# Patient Record
Sex: MALE | Race: WHITE | NOT HISPANIC OR LATINO
[De-identification: names, ages, dates, MRNs, and addresses within clinical notes are randomized per-mention and may not be internally consistent; named-entity substitution may affect disease eponyms.]

---

## 2021-05-06 ENCOUNTER — NON-APPOINTMENT (OUTPATIENT)
Age: 71
End: 2021-05-06

## 2021-05-06 ENCOUNTER — APPOINTMENT (OUTPATIENT)
Dept: INTERNAL MEDICINE | Facility: CLINIC | Age: 71
End: 2021-05-06
Payer: MEDICARE

## 2021-05-06 VITALS
RESPIRATION RATE: 12 BRPM | HEIGHT: 69 IN | OXYGEN SATURATION: 96 % | SYSTOLIC BLOOD PRESSURE: 148 MMHG | WEIGHT: 177 LBS | BODY MASS INDEX: 26.22 KG/M2 | DIASTOLIC BLOOD PRESSURE: 86 MMHG | HEART RATE: 95 BPM | TEMPERATURE: 97.3 F

## 2021-05-06 VITALS — SYSTOLIC BLOOD PRESSURE: 142 MMHG | DIASTOLIC BLOOD PRESSURE: 76 MMHG

## 2021-05-06 DIAGNOSIS — I10 ESSENTIAL (PRIMARY) HYPERTENSION: ICD-10-CM

## 2021-05-06 DIAGNOSIS — R06.02 SHORTNESS OF BREATH: ICD-10-CM

## 2021-05-06 DIAGNOSIS — Z23 ENCOUNTER FOR IMMUNIZATION: ICD-10-CM

## 2021-05-06 PROCEDURE — 99204 OFFICE O/P NEW MOD 45 MIN: CPT | Mod: 25

## 2021-05-06 PROCEDURE — 93000 ELECTROCARDIOGRAM COMPLETE: CPT

## 2021-05-19 ENCOUNTER — APPOINTMENT (OUTPATIENT)
Dept: INTERNAL MEDICINE | Facility: CLINIC | Age: 71
End: 2021-05-19
Payer: MEDICARE

## 2021-05-19 VITALS
BODY MASS INDEX: 25.92 KG/M2 | OXYGEN SATURATION: 97 % | SYSTOLIC BLOOD PRESSURE: 160 MMHG | RESPIRATION RATE: 12 BRPM | HEART RATE: 86 BPM | TEMPERATURE: 97.8 F | DIASTOLIC BLOOD PRESSURE: 84 MMHG | WEIGHT: 175 LBS | HEIGHT: 69 IN

## 2021-05-19 PROCEDURE — 99214 OFFICE O/P EST MOD 30 MIN: CPT

## 2021-05-20 NOTE — PHYSICAL EXAM
[Normal Sclera/Conjunctiva] : normal sclera/conjunctiva [Normal Outer Ear/Nose] : the outer ears and nose were normal in appearance [No JVD] : no jugular venous distention [Normal] : normal rate, regular rhythm, normal S1 and S2 and no murmur heard [No Edema] : there was no peripheral edema [Soft] : abdomen soft [Non Tender] : non-tender [Non-distended] : non-distended [Normal Anterior Cervical Nodes] : no anterior cervical lymphadenopathy [No CVA Tenderness] : no CVA  tenderness [No Joint Swelling] : no joint swelling [No Rash] : no rash [Speech Grossly Normal] : speech grossly normal [Alert and Oriented x3] : oriented to person, place, and time [de-identified] : appears anxious

## 2021-05-20 NOTE — HISTORY OF PRESENT ILLNESS
[de-identified] : \par Mr. GILES WILSON is a 70 year old male presents for follow up visit\par He was started on ambien 5mg qhs prn. Pt says it has helped him. He also changed where he was sleeping and he is able to sleep better and has not taken the ambien for the past 3 nights. He is overall feeling better. \par He complaints of getting up at night to urinate 3-4 times. Had seen Urology about 1- years ago, was prescribed medicine which he says he took for a short while\par Denies dysuria\par \par \par \par

## 2021-05-20 NOTE — HISTORY OF PRESENT ILLNESS
[FreeTextEntry8] : Mr. GILES WILSON is a 70 year old male, with hx of HTN,  presents for an acute visit\par Pt states he has been feeling anxious, having difficulty sleeping at night\par Denies suicidal ideations, intent to hurt himself or others\par Also says he has been feeling SOB since he recovered from having COVID in October. \par Also says that since having the second dose of the pfizer vaccine April 18th he has been feeling "dizzy / lightheaded like"\par Denies having blurry vision, unsteady gait, chest pain, abdominal pain, N/V/D, leg swelling, headache\par \par \par \par

## 2021-05-20 NOTE — PHYSICAL EXAM
[No Acute Distress] : no acute distress [Well Nourished] : well nourished [Well Developed] : well developed [Well-Appearing] : well-appearing [Normal Sclera/Conjunctiva] : normal sclera/conjunctiva [PERRL] : pupils equal round and reactive to light [EOMI] : extraocular movements intact [Normal Outer Ear/Nose] : the outer ears and nose were normal in appearance [Normal Oropharynx] : the oropharynx was normal [No JVD] : no jugular venous distention [No Lymphadenopathy] : no lymphadenopathy [Supple] : supple [No Respiratory Distress] : no respiratory distress  [No Accessory Muscle Use] : no accessory muscle use [Clear to Auscultation] : lungs were clear to auscultation bilaterally [Normal Rate] : normal rate  [Regular Rhythm] : with a regular rhythm [Normal S1, S2] : normal S1 and S2 [No Murmur] : no murmur heard [Pedal Pulses Present] : the pedal pulses are present [No Edema] : there was no peripheral edema [No Extremity Clubbing/Cyanosis] : no extremity clubbing/cyanosis [Soft] : abdomen soft [Non Tender] : non-tender [Non-distended] : non-distended [No Masses] : no abdominal mass palpated [No HSM] : no HSM [Normal Bowel Sounds] : normal bowel sounds [Normal Posterior Cervical Nodes] : no posterior cervical lymphadenopathy [Normal Anterior Cervical Nodes] : no anterior cervical lymphadenopathy [No CVA Tenderness] : no CVA  tenderness [No Spinal Tenderness] : no spinal tenderness [No Joint Swelling] : no joint swelling [Grossly Normal Strength/Tone] : grossly normal strength/tone [No Rash] : no rash [Coordination Grossly Intact] : coordination grossly intact [No Focal Deficits] : no focal deficits [Normal Gait] : normal gait [Normal Affect] : the affect was normal [Alert and Oriented x3] : oriented to person, place, and time [Normal Insight/Judgement] : insight and judgment were intact [de-identified] : appears anxious

## 2021-05-20 NOTE — ASSESSMENT
[FreeTextEntry1] : \par insomnia\par start ambien 5mg\par \par Anxiety\par Refuses meds\par pt says he has 'things" that are making him anxious and even if he takes the medicine the "things won't go away"\par Reccomended he see a therapist\par \par SOB - likely anxiety related\par EKG: sinus tach @ 93 bpm\par I had a lengthy discussion with pt that his insomnia, and lightheaded/SOB symptoms are likely due to his anxiety \par Pt still refuses to take anxiety meds. Says he just wants something to help him sleep\par \par f/u in one week\par \par Addendum:\par \par pt called the office later the same day requesting a call back from me regarding the dizziness\par I spoke with pt on the phone. He says that he wanted to know if I think the dizziness/lightheadedness is from the vaccine. I spoke with pt and told him it could possibly be a side effect however I still feel his symptoms are related to anxiety / lack of sleep\par \par

## 2021-05-20 NOTE — ASSESSMENT
[FreeTextEntry1] : \par Insomnia\par conrt Ambien 5mg prn\par \par nocturia\par referred to urology\par \par Anxiety\par refuses meds at this time\par reccomended, yoga, meditation, seeing a therapist

## 2021-06-28 ENCOUNTER — APPOINTMENT (OUTPATIENT)
Dept: UROLOGY | Facility: CLINIC | Age: 71
End: 2021-06-28
Payer: MEDICARE

## 2021-06-28 VITALS
TEMPERATURE: 97.8 F | RESPIRATION RATE: 12 BRPM | HEART RATE: 93 BPM | OXYGEN SATURATION: 95 % | DIASTOLIC BLOOD PRESSURE: 99 MMHG | HEIGHT: 69 IN | SYSTOLIC BLOOD PRESSURE: 178 MMHG

## 2021-06-28 DIAGNOSIS — N40.1 BENIGN PROSTATIC HYPERPLASIA WITH LOWER URINARY TRACT SYMPMS: ICD-10-CM

## 2021-06-28 DIAGNOSIS — R35.1 NOCTURIA: ICD-10-CM

## 2021-06-28 PROCEDURE — 99204 OFFICE O/P NEW MOD 45 MIN: CPT

## 2021-06-28 RX ORDER — TAMSULOSIN HYDROCHLORIDE 0.4 MG/1
0.4 CAPSULE ORAL
Qty: 30 | Refills: 1 | Status: ACTIVE | COMMUNITY
Start: 2021-06-28 | End: 1900-01-01

## 2021-06-28 NOTE — ASSESSMENT
[FreeTextEntry1] : Very pleasant 71-year-old gentleman who presents for evaluation of BPH with urinary obstruction, increased urinary frequency, nocturia\par -Discussed the natural history of BPH, as well as typical symptoms of an enlarged prostate. Discussed potential complications that could arise from BPH, including but not limited to urinary tract infections, bladder stones, and renal impairment.\par -Trial of Flomax\par -I discussed the risks, benefits, alternatives, and possible side effects of alpha blocker therapy with the patient, including but not limited to dizziness, lightheadedness, headache, blurred vision, retrograde ejaculation, and priapism with the patient. I also discussed that the patient must inform his ophthalmologist that he has taken an alpha blocker prior to undergoing cataract or glaucoma surgery.\par -Urinalysis\par -Urine culture\par -PSA\par -Follow-up in 1 month

## 2021-06-28 NOTE — HISTORY OF PRESENT ILLNESS
[FreeTextEntry1] : Very pleasant 71-year-old gentleman who presents for evaluation of BPH with urinary obstruction, increased urinary frequency, nocturia.  He reports that the symptoms have been present for many years.  He previously tried Rapaflo but reports nasal congestion and side effects for which he stopped taking the medication.  He reports that they have been worsening recently.  He reports increased urinary frequency up to every 1-1/2 hours during the day.  He reports nocturia up to 3-4 times per night.  He reports a lot of anxiety as well.

## 2021-06-29 LAB
APPEARANCE: CLEAR
BACTERIA: NEGATIVE
BILIRUBIN URINE: NEGATIVE
BLOOD URINE: NEGATIVE
COLOR: COLORLESS
GLUCOSE QUALITATIVE U: NEGATIVE
HYALINE CASTS: 0 /LPF
KETONES URINE: NEGATIVE
LEUKOCYTE ESTERASE URINE: NEGATIVE
MICROSCOPIC-UA: NORMAL
NITRITE URINE: NEGATIVE
PH URINE: 7
PROTEIN URINE: NEGATIVE
PSA SERPL-MCNC: 0.52 NG/ML
RED BLOOD CELLS URINE: 0 /HPF
SPECIFIC GRAVITY URINE: 1.01
SQUAMOUS EPITHELIAL CELLS: 0 /HPF
UROBILINOGEN URINE: NORMAL
WHITE BLOOD CELLS URINE: 0 /HPF

## 2021-06-30 LAB — BACTERIA UR CULT: NORMAL

## 2021-07-01 ENCOUNTER — APPOINTMENT (OUTPATIENT)
Dept: INTERNAL MEDICINE | Facility: CLINIC | Age: 71
End: 2021-07-01
Payer: MEDICARE

## 2021-07-01 VITALS — SYSTOLIC BLOOD PRESSURE: 160 MMHG | DIASTOLIC BLOOD PRESSURE: 82 MMHG

## 2021-07-01 VITALS
OXYGEN SATURATION: 96 % | SYSTOLIC BLOOD PRESSURE: 197 MMHG | BODY MASS INDEX: 25.48 KG/M2 | WEIGHT: 172 LBS | DIASTOLIC BLOOD PRESSURE: 99 MMHG | HEIGHT: 69 IN | HEART RATE: 98 BPM

## 2021-07-01 VITALS — SYSTOLIC BLOOD PRESSURE: 183 MMHG | DIASTOLIC BLOOD PRESSURE: 83 MMHG

## 2021-07-01 DIAGNOSIS — G47.00 INSOMNIA, UNSPECIFIED: ICD-10-CM

## 2021-07-01 DIAGNOSIS — F41.9 ANXIETY DISORDER, UNSPECIFIED: ICD-10-CM

## 2021-07-01 PROCEDURE — 99214 OFFICE O/P EST MOD 30 MIN: CPT

## 2021-07-01 RX ORDER — ZOLPIDEM TARTRATE 10 MG/1
10 TABLET ORAL
Qty: 30 | Refills: 3 | Status: ACTIVE | COMMUNITY
Start: 2021-05-06 | End: 1900-01-01

## 2021-07-01 RX ORDER — SERTRALINE HYDROCHLORIDE 50 MG/1
50 TABLET, FILM COATED ORAL DAILY
Qty: 30 | Refills: 3 | Status: ACTIVE | COMMUNITY
Start: 2021-07-01 | End: 1900-01-01

## 2021-07-01 RX ORDER — ALPRAZOLAM 0.25 MG/1
0.25 TABLET ORAL
Qty: 15 | Refills: 0 | Status: ACTIVE | COMMUNITY
Start: 2021-07-01 | End: 1900-01-01

## 2021-07-07 NOTE — HISTORY OF PRESENT ILLNESS
[de-identified] : \par Mr. GILES WILSON is a 71 year old male, accompanied by his wife presents for follow up visit\par he continues to c/o feeling anxious. We had discussed starting Zoloft in the past but pt had refused. Says he now is willing to try the Zoloft\par He is on ambien 5mg qhs prn for his insomnia. Says it does help sometimes but he does not stay asleep for too long\par \par \par \par \par

## 2021-07-07 NOTE — PHYSICAL EXAM
[Normal Sclera/Conjunctiva] : normal sclera/conjunctiva [Normal Outer Ear/Nose] : the outer ears and nose were normal in appearance [No JVD] : no jugular venous distention [Normal] : normal rate, regular rhythm, normal S1 and S2 and no murmur heard [No Edema] : there was no peripheral edema [Soft] : abdomen soft [Non Tender] : non-tender [Non-distended] : non-distended [Normal Anterior Cervical Nodes] : no anterior cervical lymphadenopathy [No CVA Tenderness] : no CVA  tenderness [No Joint Swelling] : no joint swelling [No Rash] : no rash [Coordination Grossly Intact] : coordination grossly intact [Speech Grossly Normal] : speech grossly normal [Alert and Oriented x3] : oriented to person, place, and time [Normal Insight/Judgement] : insight and judgment were intact [de-identified] : appears anxious- at times pacing back and forth in the exam room

## 2021-07-07 NOTE — ASSESSMENT
[FreeTextEntry1] : \par Anxiety\par start Zoloft 50mg daily, Xanax 0.25mg daily prn\par psychiatry referral given\par \par Insomnia\par Rx for Ambien 10mg qhs- pt instructed to take 1/2 pill and if he wakes up after a few hours and he cannot sleep he can take the other half\par \par f/u in 2 weeks\par

## 2021-07-09 ENCOUNTER — TRANSCRIPTION ENCOUNTER (OUTPATIENT)
Age: 71
End: 2021-07-09

## 2021-07-10 ENCOUNTER — INPATIENT (INPATIENT)
Facility: HOSPITAL | Age: 71
LOS: 2 days | Discharge: PSYCHIATRIC OR SUBSTANCE USE FACILITY | DRG: 908 | End: 2021-07-13
Attending: SURGERY | Admitting: SURGERY
Payer: MEDICARE

## 2021-07-10 VITALS
OXYGEN SATURATION: 95 % | SYSTOLIC BLOOD PRESSURE: 187 MMHG | RESPIRATION RATE: 18 BRPM | DIASTOLIC BLOOD PRESSURE: 97 MMHG | HEART RATE: 110 BPM | TEMPERATURE: 98 F

## 2021-07-10 DIAGNOSIS — S55.102A: ICD-10-CM

## 2021-07-10 LAB
ALBUMIN SERPL ELPH-MCNC: 4.3 G/DL — SIGNIFICANT CHANGE UP (ref 3.3–5)
ALP SERPL-CCNC: 59 U/L — SIGNIFICANT CHANGE UP (ref 40–120)
ALT FLD-CCNC: 37 U/L — SIGNIFICANT CHANGE UP (ref 10–45)
ANION GAP SERPL CALC-SCNC: 13 MMOL/L — SIGNIFICANT CHANGE UP (ref 5–17)
APTT BLD: 25 SEC — LOW (ref 27.5–35.5)
AST SERPL-CCNC: 32 U/L — SIGNIFICANT CHANGE UP (ref 10–40)
BASOPHILS # BLD AUTO: 0.06 K/UL — SIGNIFICANT CHANGE UP (ref 0–0.2)
BASOPHILS NFR BLD AUTO: 0.7 % — SIGNIFICANT CHANGE UP (ref 0–2)
BILIRUB SERPL-MCNC: 0.3 MG/DL — SIGNIFICANT CHANGE UP (ref 0.2–1.2)
BLD GP AB SCN SERPL QL: NEGATIVE — SIGNIFICANT CHANGE UP
BUN SERPL-MCNC: 26 MG/DL — HIGH (ref 7–23)
CALCIUM SERPL-MCNC: 9 MG/DL — SIGNIFICANT CHANGE UP (ref 8.4–10.5)
CHLORIDE SERPL-SCNC: 105 MMOL/L — SIGNIFICANT CHANGE UP (ref 96–108)
CO2 SERPL-SCNC: 21 MMOL/L — LOW (ref 22–31)
CREAT SERPL-MCNC: 1.16 MG/DL — SIGNIFICANT CHANGE UP (ref 0.5–1.3)
EOSINOPHIL # BLD AUTO: 0.31 K/UL — SIGNIFICANT CHANGE UP (ref 0–0.5)
EOSINOPHIL NFR BLD AUTO: 3.6 % — SIGNIFICANT CHANGE UP (ref 0–6)
ETHANOL SERPL-MCNC: SIGNIFICANT CHANGE UP MG/DL (ref 0–10)
GLUCOSE SERPL-MCNC: 182 MG/DL — HIGH (ref 70–99)
HCT VFR BLD CALC: 40.9 % — SIGNIFICANT CHANGE UP (ref 39–50)
HGB BLD-MCNC: 13.6 G/DL — SIGNIFICANT CHANGE UP (ref 13–17)
IMM GRANULOCYTES NFR BLD AUTO: 0.6 % — SIGNIFICANT CHANGE UP (ref 0–1.5)
INR BLD: 1.02 RATIO — SIGNIFICANT CHANGE UP (ref 0.88–1.16)
LYMPHOCYTES # BLD AUTO: 1.48 K/UL — SIGNIFICANT CHANGE UP (ref 1–3.3)
LYMPHOCYTES # BLD AUTO: 17.1 % — SIGNIFICANT CHANGE UP (ref 13–44)
MCHC RBC-ENTMCNC: 30.5 PG — SIGNIFICANT CHANGE UP (ref 27–34)
MCHC RBC-ENTMCNC: 33.3 GM/DL — SIGNIFICANT CHANGE UP (ref 32–36)
MCV RBC AUTO: 91.7 FL — SIGNIFICANT CHANGE UP (ref 80–100)
MONOCYTES # BLD AUTO: 0.78 K/UL — SIGNIFICANT CHANGE UP (ref 0–0.9)
MONOCYTES NFR BLD AUTO: 9 % — SIGNIFICANT CHANGE UP (ref 2–14)
NEUTROPHILS # BLD AUTO: 5.99 K/UL — SIGNIFICANT CHANGE UP (ref 1.8–7.4)
NEUTROPHILS NFR BLD AUTO: 69 % — SIGNIFICANT CHANGE UP (ref 43–77)
NRBC # BLD: 0 /100 WBCS — SIGNIFICANT CHANGE UP (ref 0–0)
PLATELET # BLD AUTO: 217 K/UL — SIGNIFICANT CHANGE UP (ref 150–400)
POTASSIUM SERPL-MCNC: 3.5 MMOL/L — SIGNIFICANT CHANGE UP (ref 3.5–5.3)
POTASSIUM SERPL-SCNC: 3.5 MMOL/L — SIGNIFICANT CHANGE UP (ref 3.5–5.3)
PROT SERPL-MCNC: 6.7 G/DL — SIGNIFICANT CHANGE UP (ref 6–8.3)
PROTHROM AB SERPL-ACNC: 12.2 SEC — SIGNIFICANT CHANGE UP (ref 10.6–13.6)
RBC # BLD: 4.46 M/UL — SIGNIFICANT CHANGE UP (ref 4.2–5.8)
RBC # FLD: 12.3 % — SIGNIFICANT CHANGE UP (ref 10.3–14.5)
RH IG SCN BLD-IMP: POSITIVE — SIGNIFICANT CHANGE UP
SARS-COV-2 RNA SPEC QL NAA+PROBE: SIGNIFICANT CHANGE UP
SODIUM SERPL-SCNC: 139 MMOL/L — SIGNIFICANT CHANGE UP (ref 135–145)
WBC # BLD: 8.67 K/UL — SIGNIFICANT CHANGE UP (ref 3.8–10.5)
WBC # FLD AUTO: 8.67 K/UL — SIGNIFICANT CHANGE UP (ref 3.8–10.5)

## 2021-07-10 PROCEDURE — 35206 REPAIR BLOOD VESSEL DIR UXTR: CPT | Mod: LT

## 2021-07-10 PROCEDURE — 73206 CT ANGIO UPR EXTRM W/O&W/DYE: CPT | Mod: 26,LT,MA

## 2021-07-10 PROCEDURE — 99223 1ST HOSP IP/OBS HIGH 75: CPT

## 2021-07-10 PROCEDURE — 99291 CRITICAL CARE FIRST HOUR: CPT | Mod: GC

## 2021-07-10 RX ORDER — ONDANSETRON 8 MG/1
4 TABLET, FILM COATED ORAL ONCE
Refills: 0 | Status: DISCONTINUED | OUTPATIENT
Start: 2021-07-10 | End: 2021-07-11

## 2021-07-10 RX ORDER — FENTANYL CITRATE 50 UG/ML
25 INJECTION INTRAVENOUS
Refills: 0 | Status: DISCONTINUED | OUTPATIENT
Start: 2021-07-10 | End: 2021-07-11

## 2021-07-10 RX ORDER — SODIUM CHLORIDE 9 MG/ML
1000 INJECTION INTRAMUSCULAR; INTRAVENOUS; SUBCUTANEOUS ONCE
Refills: 0 | Status: COMPLETED | OUTPATIENT
Start: 2021-07-10 | End: 2021-07-10

## 2021-07-10 RX ORDER — OXYCODONE AND ACETAMINOPHEN 5; 325 MG/1; MG/1
1 TABLET ORAL EVERY 4 HOURS
Refills: 0 | Status: DISCONTINUED | OUTPATIENT
Start: 2021-07-10 | End: 2021-07-11

## 2021-07-10 RX ADMIN — SODIUM CHLORIDE 1000 MILLILITER(S): 9 INJECTION INTRAMUSCULAR; INTRAVENOUS; SUBCUTANEOUS at 18:36

## 2021-07-10 NOTE — ED PROVIDER NOTE - ATTENDING CONTRIBUTION TO CARE
Attending Statement (EDMUNDO Simental MD):    HPI: 70 y/o M presenting after intention self-injury to the left wrist: cut wrist with knife, and presents with obvious arterial bleeding via EMS; distal to injury, appears neurovascularly intact; cap refill <2 sec and pink fingers; moving all fingers; bleeding controlled with pressure; hand and vascular surgery consulted, and patient to be admitted for ongoing evaluation/management. Attending Statement (EDMUNDO Simental MD): 72 y/o M presenting after intention self-injury to the left wrist: cut wrist with knife, and presents with obvious arterial bleeding via EMS; distal to injury, appears neurovascularly intact; cap refill <2 sec and pink fingers; moving all fingers; bleeding controlled with pressure; hand and vascular surgery consulted, and patient to be admitted for ongoing evaluation/management.

## 2021-07-10 NOTE — PRE-ANESTHESIA EVALUATION ADULT - NSANTHAPLANRD_GEN_ALL_CORE
Medication Question or Clarification  Who is calling: Patient  What medication are you calling about? (include dose and sig)   losartan (COZAAR) 100 MG tablet 90 tablet 0 12/27/2018  No   Sig: TAKE 1 TABLET BY MOUTH ONCE DAILY   Sent to pharmacy as: losartan (COZAAR) 100 MG tablet     Who prescribed the medication?: Charlotte Ha MD   What is your question/concern?: I was told there was a recall on this and I have been taking this. Should I continue and discontinue this and what alterative I would need. Please return call ASAP  Pharmacy: [facility] [place]  Okay to leave a detailed message?: Yes  Site CMT - Please call the pharmacy to obtain any additional needed information.   general

## 2021-07-10 NOTE — ED PROVIDER NOTE - CROS ED NEURO ALL NEG
Quality 226: Preventive Care And Screening: Tobacco Use: Screening And Cessation Intervention: Patient screened for tobacco use and is an ex/non-smoker negative... Quality 431: Preventive Care And Screening: Unhealthy Alcohol Use - Screening: Patient screened for unhealthy alcohol use using a single question and scores less than 2 times per year Detail Level: Detailed

## 2021-07-10 NOTE — ED PROVIDER NOTE - PROGRESS NOTE DETAILS
Attending note (Hola): called back to room after CT, patietn c/o numbness in fingers; ahnd appears dusky; cap refill delay; ace wrap on wrist removed and re-wrapped looser, no bleedign through bnandage and hand immediately became pink/well-perfused with cap refil < 2sec and patient reported return of sensation.  Hand consulted again; case discussed with Dr Barcenas who reports will be at least 1 hour; vascular surgery consulted, in house team to see patient shortly.  Results of CTA reviewed no contrast extravasation noted.

## 2021-07-10 NOTE — ED PROVIDER NOTE - ENMT, MLM
Airway patent, Nasal mucosa clear. Mouth with normal mucosa.  No pallor, Throat has no vesicles, no oropharyngeal exudates and uvula is midline.

## 2021-07-10 NOTE — ED PROVIDER NOTE - CONSTITUTIONAL, MLM
normal... tired appearing M oriented to person, place, time/situation and in no apparent distress. able to answer questions but poor flow of speech, does not volunteer information

## 2021-07-10 NOTE — H&P ADULT - HISTORY OF PRESENT ILLNESS
70y/o M with PMH of HTN [place]. Per wife, family moved 3wks ago and since then pt has had difficulty with the changes in his life. 1 wk ago he was admitted to [facility] for 1d to psychiatry mon for threatening to commit suicide. EMS called to [place] after pt cut his L radial wrist with a knife. Dx on dc was adjustment disorder and severe anxiety. Pt has no psych hx, lost job during pandemic, family planning further move to [place]. Never had depression or psychosis; no prior attempts of suicide. No hx of anxiety prior, no other somatic symptoms - no fever, palpitations, diaphoresis, CP, SOB, abd pain, N/V/D/C. Denied drug/EtOH use. Wife said he never was violent, she feels safe at home w/ him at baseline.    Patient seen in ED, pressure dressing in place. Removed dressing to assess injury. Pulsatile bleeding noted. Patient remains hemodynamically stable in ED.

## 2021-07-10 NOTE — ED PROVIDER NOTE - OBJECTIVE STATEMENT
72y/o M with PMH of HTN [place] ED [place]. Per wife, family moved 3wks ago and since then pt has had difficulty with the changes in his life. 1 wk ago he was admitted to [facility] for 1d to psychiatry mon for threatening to commit suicide. EMS called to [place] after pt cut his L radial wrist with a knife. Dx on dc was adjustment disorder and severe anxiety. Pt has no psych hx, lost job during pandemic, family planning further move to [place]. Never had depression or psychosis; no prior attempts of suicide. No hx of anxiety prior, no other somatic symptoms - no fever, palpitations, diaphoresis, CP, SOB, abd pain, N/V/D/C. Denied drug/EtOH use. Wife said he never was violent, she feels safe at home w/ him at baseline.

## 2021-07-10 NOTE — ED ADULT NURSE NOTE - OBJECTIVE STATEMENT
78 y/o male from home brought by EMS after a SI attempt to left wrist.  PMH.  Pt was previously admitted to psych floor where he spent 1 night for SI ideation, pt was d/c 2 days ago.  Pt wife is a nurse called EMS.  Pt lethargic, and uncooperative at this time.  no complaint of chest pain, SOB, abd pain. Pt Alert to self, equal rise and fall of chest, abd soft and non tender, skin warm and dry, fingers to left hand cool.  Pt wrist wrapped with bandages from EMS, on assessment MD deemed injury is arterial, wound wrapped with pressure dressing.  18G IV to right forearm warm NS bolus initiated.

## 2021-07-10 NOTE — ED ADULT NURSE REASSESSMENT NOTE - NS ED NURSE REASSESS COMMENT FT1
RN Note: pt attempted suicide today at home, pt laceration to left wrist, arterial injury, pt alert and priented at this time, 1:1 placed for safety.  pt clothing removed, belonings given to wife. VSS.

## 2021-07-10 NOTE — H&P ADULT - ASSESSMENT
71y year old Male w PMHx of HTN who presents with L wrist laceration s/p suicide attempt with injury to L radial artery    - Admit to Dr. Good  - Emergent OR tonight for exploration of LUE wound, possible ligation, possible repair  - NPO  - Preop  - Discussed with Vascular Surgery fellow, Dr. Marcos    Vascular Surgery  [id number]

## 2021-07-10 NOTE — ED PROVIDER NOTE - PSYCHIATRIC RISK
denied wish to hurt others; asking to "be given some medication to sleep so I can't feel this anymore"/EVIDENCE OF CUTTING/VERBALIZING WISH TO HARM SELF

## 2021-07-10 NOTE — ED PROVIDER NOTE - NEUROLOGICAL, MLM
Alert and oriented, no focal deficits, no motor or sensory deficits. able to move fingers upon request

## 2021-07-10 NOTE — H&P ADULT - NSHPPHYSICALEXAM_GEN_ALL_CORE
General: WN/WD NAD  Neurology: A&Ox3, nonfocal, DIXON x 4  Head:  Normocephalic, atraumatic  ENT:  Mucosa moist, no ulcerations  Respiratory: nonlabored respirations  CV: RRR, S1S2, no murmur  RUE: pressure dressing in place, pulsatile arterial bleeding noted from L wrist radial-sided laceration  two sites of bleeding noted  pressure dressing replaced

## 2021-07-10 NOTE — ED PROVIDER NOTE - CLINICAL SUMMARY MEDICAL DECISION MAKING FREE TEXT BOX
70y/o M with no psych hx until recently, was in psych mon 1 wk ago for 'adjustment disorder' and threats of self harm brought to ED after cutting L wrist including radial involvement. Hand surgery Dr. Barcenas called. Frequent reassesment of hand vascular status. PLAN: labs, CTA, plastics assessment - will need psych eval. To be reassessed

## 2021-07-10 NOTE — H&P ADULT - NSHPLABSRESULTS_GEN_ALL_CORE
.  LABS:                         13.6   8.67  )-----------( 217      ( 10 Jul 2021 18:38 )             40.9     07-10    139  |  105  |  26<H>  ----------------------------<  182<H>  3.5   |  21<L>  |  1.16    Ca    9.0      10 Jul 2021 18:38    TPro  6.7  /  Alb  4.3  /  TBili  0.3  /  DBili  x   /  AST  32  /  ALT  37  /  AlkPhos  59  07-10    PT/INR - ( 10 Jul 2021 18:38 )   PT: 12.2 sec;   INR: 1.02 ratio         PTT - ( 10 Jul 2021 18:38 )  PTT:25.0 sec          RADIOLOGY, EKG & ADDITIONAL TESTS: Reviewed.

## 2021-07-10 NOTE — ED PROVIDER NOTE - UPPER EXTREMITY EXAM, LEFT
2 inch lac on L radial wrist, with arterial pulsation still actively bleeding - pressure dressing immediately applied

## 2021-07-10 NOTE — PRE-ANESTHESIA EVALUATION ADULT - NSANTHPMHFT_GEN_ALL_CORE
H&P-  72 y/o M.  PMHX-  HTN.  ADMISSION-   P[place] ED [place]. Per wife, family moved 3wks ago and since then pt has had difficulty with the changes in his life. 1 wk ago he was admitted to [facility] for 1d to psychiatry mon for threatening to commit suicide. EMS called to [place] after pt cut his L radial wrist with a knife. Dx on dc was adjustment disorder and severe anxiety. Pt has no psych hx, lost job during pandemic, family planning further move to [place]. Never had depression or psychosis; no prior attempts of suicide. No hx of anxiety prior, no other somatic symptoms - no fever, palpitations, diaphoresis, CP, SOB, abd pain, N/V/D/C. Denied drug/EtOH use. Wife said he never was violent, she feels safe at home w/ him at baseline.

## 2021-07-10 NOTE — ED ADULT NURSE REASSESSMENT NOTE - NS ED NURSE REASSESS COMMENT FT1
RN note: pt awaiting OR, vitals stable, report given to OR to sulaiman Lac to left wrist, VSS wife at bedside

## 2021-07-11 LAB
ANION GAP SERPL CALC-SCNC: 11 MMOL/L — SIGNIFICANT CHANGE UP (ref 5–17)
BUN SERPL-MCNC: 17 MG/DL — SIGNIFICANT CHANGE UP (ref 7–23)
CALCIUM SERPL-MCNC: 8.8 MG/DL — SIGNIFICANT CHANGE UP (ref 8.4–10.5)
CHLORIDE SERPL-SCNC: 105 MMOL/L — SIGNIFICANT CHANGE UP (ref 96–108)
CO2 SERPL-SCNC: 23 MMOL/L — SIGNIFICANT CHANGE UP (ref 22–31)
CREAT SERPL-MCNC: 0.98 MG/DL — SIGNIFICANT CHANGE UP (ref 0.5–1.3)
GLUCOSE SERPL-MCNC: 152 MG/DL — HIGH (ref 70–99)
HCT VFR BLD CALC: 37 % — LOW (ref 39–50)
HGB BLD-MCNC: 12.2 G/DL — LOW (ref 13–17)
MAGNESIUM SERPL-MCNC: 2.2 MG/DL — SIGNIFICANT CHANGE UP (ref 1.6–2.6)
MCHC RBC-ENTMCNC: 30.2 PG — SIGNIFICANT CHANGE UP (ref 27–34)
MCHC RBC-ENTMCNC: 33 GM/DL — SIGNIFICANT CHANGE UP (ref 32–36)
MCV RBC AUTO: 91.6 FL — SIGNIFICANT CHANGE UP (ref 80–100)
NRBC # BLD: 0 /100 WBCS — SIGNIFICANT CHANGE UP (ref 0–0)
PHOSPHATE SERPL-MCNC: 2.7 MG/DL — SIGNIFICANT CHANGE UP (ref 2.5–4.5)
PLATELET # BLD AUTO: 226 K/UL — SIGNIFICANT CHANGE UP (ref 150–400)
POTASSIUM SERPL-MCNC: 4 MMOL/L — SIGNIFICANT CHANGE UP (ref 3.5–5.3)
POTASSIUM SERPL-SCNC: 4 MMOL/L — SIGNIFICANT CHANGE UP (ref 3.5–5.3)
RBC # BLD: 4.04 M/UL — LOW (ref 4.2–5.8)
RBC # FLD: 12.5 % — SIGNIFICANT CHANGE UP (ref 10.3–14.5)
SODIUM SERPL-SCNC: 139 MMOL/L — SIGNIFICANT CHANGE UP (ref 135–145)
WBC # BLD: 10.3 K/UL — SIGNIFICANT CHANGE UP (ref 3.8–10.5)
WBC # FLD AUTO: 10.3 K/UL — SIGNIFICANT CHANGE UP (ref 3.8–10.5)

## 2021-07-11 PROCEDURE — 99223 1ST HOSP IP/OBS HIGH 75: CPT | Mod: GC

## 2021-07-11 RX ORDER — MIRTAZAPINE 45 MG/1
7.5 TABLET, ORALLY DISINTEGRATING ORAL AT BEDTIME
Refills: 0 | Status: DISCONTINUED | OUTPATIENT
Start: 2021-07-11 | End: 2021-07-13

## 2021-07-11 RX ORDER — OXYCODONE HYDROCHLORIDE 5 MG/1
5 TABLET ORAL THREE TIMES A DAY
Refills: 0 | Status: DISCONTINUED | OUTPATIENT
Start: 2021-07-11 | End: 2021-07-13

## 2021-07-11 RX ORDER — ACETAMINOPHEN 500 MG
650 TABLET ORAL EVERY 6 HOURS
Refills: 0 | Status: DISCONTINUED | OUTPATIENT
Start: 2021-07-11 | End: 2021-07-11

## 2021-07-11 RX ORDER — ASPIRIN/CALCIUM CARB/MAGNESIUM 324 MG
81 TABLET ORAL DAILY
Refills: 0 | Status: DISCONTINUED | OUTPATIENT
Start: 2021-07-11 | End: 2021-07-13

## 2021-07-11 RX ORDER — CLONAZEPAM 1 MG
0.5 TABLET ORAL
Refills: 0 | Status: DISCONTINUED | OUTPATIENT
Start: 2021-07-11 | End: 2021-07-13

## 2021-07-11 RX ORDER — ACETAMINOPHEN 500 MG
650 TABLET ORAL EVERY 6 HOURS
Refills: 0 | Status: DISCONTINUED | OUTPATIENT
Start: 2021-07-11 | End: 2021-07-13

## 2021-07-11 RX ORDER — HEPARIN SODIUM 5000 [USP'U]/ML
5000 INJECTION INTRAVENOUS; SUBCUTANEOUS EVERY 12 HOURS
Refills: 0 | Status: DISCONTINUED | OUTPATIENT
Start: 2021-07-11 | End: 2021-07-13

## 2021-07-11 RX ORDER — SODIUM,POTASSIUM PHOSPHATES 278-250MG
1 POWDER IN PACKET (EA) ORAL ONCE
Refills: 0 | Status: COMPLETED | OUTPATIENT
Start: 2021-07-11 | End: 2021-07-11

## 2021-07-11 RX ORDER — LANOLIN ALCOHOL/MO/W.PET/CERES
3 CREAM (GRAM) TOPICAL AT BEDTIME
Refills: 0 | Status: DISCONTINUED | OUTPATIENT
Start: 2021-07-11 | End: 2021-07-13

## 2021-07-11 RX ORDER — ACETAMINOPHEN 500 MG
325 TABLET ORAL EVERY 4 HOURS
Refills: 0 | Status: DISCONTINUED | OUTPATIENT
Start: 2021-07-11 | End: 2021-07-11

## 2021-07-11 RX ADMIN — Medication 650 MILLIGRAM(S): at 16:59

## 2021-07-11 RX ADMIN — Medication 1 TABLET(S): at 13:32

## 2021-07-11 RX ADMIN — HEPARIN SODIUM 5000 UNIT(S): 5000 INJECTION INTRAVENOUS; SUBCUTANEOUS at 16:59

## 2021-07-11 RX ADMIN — Medication 650 MILLIGRAM(S): at 13:13

## 2021-07-11 RX ADMIN — Medication 81 MILLIGRAM(S): at 13:13

## 2021-07-11 RX ADMIN — Medication 0.5 MILLIGRAM(S): at 16:59

## 2021-07-11 RX ADMIN — OXYCODONE AND ACETAMINOPHEN 1 TABLET(S): 5; 325 TABLET ORAL at 03:56

## 2021-07-11 RX ADMIN — OXYCODONE AND ACETAMINOPHEN 1 TABLET(S): 5; 325 TABLET ORAL at 03:26

## 2021-07-11 RX ADMIN — MIRTAZAPINE 7.5 MILLIGRAM(S): 45 TABLET, ORALLY DISINTEGRATING ORAL at 21:45

## 2021-07-11 RX ADMIN — Medication 3 MILLIGRAM(S): at 01:41

## 2021-07-11 RX ADMIN — HEPARIN SODIUM 5000 UNIT(S): 5000 INJECTION INTRAVENOUS; SUBCUTANEOUS at 07:11

## 2021-07-11 NOTE — BH CONSULTATION LIAISON ASSESSMENT NOTE - DESCRIPTION
Domiciled with wife of 5 years in [facility]. 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

## 2021-07-11 NOTE — PROGRESS NOTE ADULT - SUBJECTIVE AND OBJECTIVE BOX
VASCULAR SURGERY PROGRESS NOTE    Post-Op Day #1d  Procedure: Repair of laceration of left radial artery      SUBJECTIVE  Pt seen and examined during morning rounds. Pt reports continued anxiety. Denies N/V/fever/chills. Pain well-controlled.      PMHx  ·	HTN (hypertension)      OBJECTIVE:    PHYSICAL EXAM   General Appearance: Pt pacing around room, appears anxious   Resp: Patent airway, non-labored breathing  CV: RRR  Abdomen: Soft, Nontender, Nondistended  Vascular: B/L palpable radial and ulnar pulses, L wrist dressing clean/dry/intact, LUE sensation/strength intact  Neuro: no focal deficits    Vital Signs Last 24 Hrs  T(C): 36.6 (11 Jul 2021 06:33), Max: 37.3 (10 Jul 2021 20:56)  T(F): 97.8 (11 Jul 2021 06:33), Max: 99.1 (10 Jul 2021 20:56)  HR: 98 (11 Jul 2021 06:33) (75 - 110)  BP: 133/77 (11 Jul 2021 06:33) (120/59 - 187/97)  BP(mean): 95 (11 Jul 2021 02:00) (83 - 111)  RR: 20 (11 Jul 2021 06:33) (14 - 20)  SpO2: 98% (11 Jul 2021 06:33) (95% - 100%)    I's & O's    07-10-21 @ 07:01  -  07-11-21 @ 07:00  --------------------------------------------------------  IN: 268 mL / OUT: 680 mL / NET: -412 mL    07-11-21 @ 07:01  -  07-11-21 @ 08:37  --------------------------------------------------------  IN: 180 mL / OUT: 0 mL / NET: 180 mL          MEDICATIONS:  ·	DVT PROPHYLAXIS: heparin   Injectable 5000 Unit(s)      LAB/STUDIES:                        12.2   10.30 )-----------( 226      ( 11 Jul 2021 07:16 )             37.0       139  |  105  |  17  ----------------------------<  152<H>  4.0   |  23  |  0.98    Ca    8.8      11 Jul 2021 07:15  Phos  2.7     07-11  Mg     2.2     07-11    TPro  6.7  /  Alb  4.3  /  TBili  0.3  /  DBili  x   /  AST  32  /  ALT  37  /  AlkPhos  59  07-10    PT/INR - ( 10 Jul 2021 18:38 )   PT: 12.2 sec;   INR: 1.02 ratio    PTT - ( 10 Jul 2021 18:38 )  PTT:25.0 sec    LIVER FUNCTIONS - ( 10 Jul 2021 18:38 )  Alb: 4.3 g/dL / Pro: 6.7 g/dL / ALK PHOS: 59 U/L / ALT: 37 U/L / AST: 32 U/L / GGT: x             IMAGING:  CT Angio Upper Extremity 7/10   ·	no skeletal abnormality, no contrast extravasation identified, brachial/ulnar/radial arteries visualized w/accompanying veins

## 2021-07-11 NOTE — CHART NOTE - NSCHARTNOTEFT_GEN_A_CORE
POST-OP NOTE:    Procedure:  Repair of laceration of left radial artery     Subjective:    Vital Signs Last 24 Hrs  Afebrile, VSS                        13.6   8.67  )-----------( 217      ( 10 Jul 2021 18:38 )             40.9     07-10    139  |  105  |  26<H>  ----------------------------<  182<H>  3.5   |  21<L>  |  1.16    Ca    9.0      10 Jul 2021 18:38    TPro  6.7  /  Alb  4.3  /  TBili  0.3  /  DBili  x   /  AST  32  /  ALT  37  /  AlkPhos  59  07-10   PT/INR - ( 10 Jul 2021 18:38 )   PT: 12.2 sec;   INR: 1.02 ratio         PTT - ( 10 Jul 2021 18:38 )  PTT:25.0 sec    PHYSICAL EXAM:  Gen: NAD, well-developed, resting comfortably  Resp: breathing easily, no stridor  Extremities: Opsite covering inner L wrist - bandages were non-bloody, palpable ulnar and faint radial pulse, L hand well perfused, sensation intact over LLE, strength LLE and hand +5/5

## 2021-07-11 NOTE — BH CONSULTATION LIAISON ASSESSMENT NOTE - RISK ASSESSMENT
The patient has a high baseline risk of self-harm due to an underlying anxiety disorder. Other static risk factors include recent suicide attempt, age, sex. Protective factors include social supports, willingness to engage in therapeutic relationship. Modifiable risk factors include living situation, financial instability. Immediate risk will be minimized by inpatient admission to a safe environment with appropriate supervision and limited access to lethal means. Future risk will be minimized before discharge by treatment of acute mood/psychotic symptoms, maximizing outpatient support, providing relevant patient education, and ensuring close follow-up. The patient has a high baseline risk of self-harm due to an underlying anxiety disorder. Other static risk factors include recent suicide attempt, age, sex. Protective factors include social supports, willingness to engage in therapeutic relationship. Modifiable risk factors include living situation, financial instability. Immediate risk will be minimized by inpatient admission to a safe environment with appropriate supervision and limited access to lethal means. Future risk will be minimized before discharge by treatment of acute mood/psychotic symptoms, maximizing outpatient support, providing relevant patient education, and ensuring close follow-up.    Overall risk acutely elevated in setting of recent SA

## 2021-07-11 NOTE — PROGRESS NOTE ADULT - ASSESSMENT
72y/o M with PMH of HTN, adjustment disorder, severe anxiety and recent psych hospitalization presents to ED after [place] wrist [place] the park. Denies hx of depression or psychosis; no prior attempts of suicide. No hx of anxiety prior, no other somatic symptoms - no fever, palpitations, diaphoresis, CP, SOB, abd pain, N/V/D/C. Denied drug/EtOH use. Pt was taken to the OR for a repair of laceration of left radial artery, stable in the PACU. and transferred to the floor.    PLAN:  - Cont home medications  - ASA 81mg today  - No AC  - Pain: standing Tylenol, standing Motrin  - Diet: Regular  - OOBAT  - 1:1 psych monitoring 72y/o M with PMH of HTN, adjustment disorder, severe anxiety and recent psych hospitalization presents to ED after [place] L wrist in the park. Denies hx of depression or psychosis; no prior attempts of suicide. No hx of anxiety prior, no other somatic symptoms - no fever, palpitations, diaphoresis, CP, SOB, abd pain, N/V/D/C. Denied drug/EtOH use. Pt was taken to the OR for a repair of laceration of left radial artery, stable in the PACU. and transferred to the floor.    PLAN:  - Cont home medications  - ASA 81mg today  - No AC  - Pain: standing Tylenol, standing Motrin  - Diet: Regular  - OOBAT  - 1:1 psych monitoring  - Consider Psych consult/abbie Leblanc, PGY-1  Vascular Surgery  Pager [phone]  72y/o M with PMH of HTN, adjustment disorder, severe anxiety and recent psych hospitalization presents to ED after cutting L wrist in the park. Denies hx of depression or psychosis; no prior attempts of suicide. No hx of anxiety prior, no other somatic symptoms - no fever, palpitations, diaphoresis, CP, SOB, abd pain, N/V/D/C. Denied drug/EtOH use. Pt was taken to the OR for a repair of laceration of left radial artery, stable in the PACU. and transferred to the floor.    PLAN:  - Cont home medications  - ASA 81mg today  - No AC  - Pain: standing Tylenol  - Diet: Regular  - OOBAT  - 1:1 psych monitoring  - Consider Psych consult/abbie Leblanc, PGY-1  Vascular Surgery  Pager [phone]  72y/o M with PMH of HTN, adjustment disorder, severe anxiety and recent psych hospitalization presents to ED after cutting L wrist in the park. Denies hx of depression or psychosis; no prior attempts of suicide. No hx of anxiety prior, no other somatic symptoms - no fever, palpitations, diaphoresis, CP, SOB, abd pain, N/V/D/C. Denied drug/EtOH use. Pt was taken to the OR for a repair of laceration of left radial artery, stable in the PACU. and transferred to the floor.    PLAN:  - Cont home medications  - ASA 81mg today  - No AC  - Pain: standing Tylenol  - Diet: Regular  - OOBAT  - 1:1 psych monitoring  - Consider Psych consult/eval  - F/U case management  - F/U aurelia Leblanc, PGY-1  Vascular Surgery  Pager [phone]

## 2021-07-11 NOTE — BH CONSULTATION LIAISON ASSESSMENT NOTE - SUMMARY
Patient is a 70 yo male, , domiciled with wife in [place], no past psychiatric history, one recent psychiatric hospitalization for suicidal ideation at [facility], admitted to [facility] after suicide attempt via slashing his L wrist. Patient expresses history of several months of increasing anxiety in the context of financial instability and instability in living situation. He endorses severe hopelessness and guilt. On interview, patient is anxious and tearful. He wants to seek help to manage his anxiety. Patient likely presenting during an episode of major depressive disorder vs. adjustment disorder with anxiety and depression.

## 2021-07-11 NOTE — BH CONSULTATION LIAISON ASSESSMENT NOTE - NSACTIVEVENT_PSY_ALL_CORE
Triggering events leading to humiliation, shame, and/or despair (e.g., Loss of relationship, financial or health status) (real or anticipated)/Current or pending social isolation/Perceived burden on family or others

## 2021-07-11 NOTE — BH CONSULTATION LIAISON ASSESSMENT NOTE - DIFFERENTIAL
episode of major depressive disorder with suicide attempt vs. adjustment disorder with anxiety and depression with suicide attempt

## 2021-07-11 NOTE — BH CONSULTATION LIAISON ASSESSMENT NOTE - NSBHCHARTREVIEWVS_PSY_A_CORE FT
Vital Signs Last 24 Hrs  T(C): 36.5 (11 Jul 2021 13:22), Max: 37.3 (10 Jul 2021 20:56)  T(F): 97.7 (11 Jul 2021 13:22), Max: 99.1 (10 Jul 2021 20:56)  HR: 97 (11 Jul 2021 13:22) (75 - 110)  BP: 144/73 (11 Jul 2021 13:22) (120/59 - 187/97)  BP(mean): 95 (11 Jul 2021 02:00) (83 - 111)  RR: 20 (11 Jul 2021 13:22) (14 - 20)  SpO2: 98% (11 Jul 2021 13:22) (95% - 100%)

## 2021-07-11 NOTE — BH CONSULTATION LIAISON ASSESSMENT NOTE - DETAILS
Admitted to [facility] for 1 day, discharged with follow up appointment scheduled for Thursday 7/15 see HPI

## 2021-07-11 NOTE — BH CONSULTATION LIAISON ASSESSMENT NOTE - CASE SUMMARY
71M , domiciled, recently unemployed, with PPHx recent onset of depression, 1 recent psych admission at [facility] (for 1 day, d/c 1 week ago), no prior SA, no substance abuse, PMH significant for HTN presents to ED after cutting L wrist in the park.  Pt states he has been very anxiety lately over losing his job during the pandemic and moving to a new apartment recently that has a lot of problems.  He has felt so agitated and anxious over financial worries and upset over his new housing that he developed SI resulting in an admission to [facility] inpt psych last week for 1 day, with an OP psych intake appointment scheduled for this Wednesday.  Then states over the weekend pt's family discussed moving pt and his wife to [place] to be near his cousins.  Pt again became anxious and ruminative, premeditated another SA to cut his wrist which he did at the park with a knife, resulting in a severed radial artery.  Pt called his wife to inform her of the act who then called 911.  Pt states he is unsure whether he can maintain safety, still severely anxious, not sleeping, hopeless, pacing his room.  States he thinks he might have a prior h/o OCD due to checking behaviors, but this has been an ongoing problem.  States Zoloft caused significant SI, but he would consider alternative medications if recommended.  Denies AVH or substance abuse.  Dx: MDD severe.  Recs: Start Remeron/Klonopin as above, 1:1, [facility] psych will f/u, will likely require psychiatric admission.

## 2021-07-11 NOTE — BH CONSULTATION LIAISON ASSESSMENT NOTE - NSBHCHARTREVIEWLAB_PSY_A_CORE FT
12.2   10.30 )-----------( 226      ( 11 Jul 2021 07:16 )             37.0     07-11    139  |  105  |  17  ----------------------------<  152<H>  4.0   |  23  |  0.98    Ca    8.8      11 Jul 2021 07:15  Phos  2.7     07-11  Mg     2.2     07-11    TPro  6.7  /  Alb  4.3  /  TBili  0.3  /  DBili  x   /  AST  32  /  ALT  37  /  AlkPhos  59  07-10

## 2021-07-11 NOTE — BH CONSULTATION LIAISON ASSESSMENT NOTE - HPI (INCLUDE ILLNESS QUALITY, SEVERITY, DURATION, TIMING, CONTEXT, MODIFYING FACTORS, ASSOCIATED SIGNS AND SYMPTOMS)
Patient is a 70 yo male, , domiciled with wife in [place], no past psychiatric history, one recent psychiatric hospitalization for suicidal ideation at [facility], admitted to [facility] after suicide attempt via slashing his L wrist. Patient seen at bedside by team. On approach, patient is pacing around room. When the interviewer asked him to sit, he stated that he could not because of his anxiety. He reports that his anxiety started in March 2021 after moving to a new apartment with many issues. 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 His family offered to help move him and his wife to [place] this week in an effort to get away from the [place] apartment. In anticipation of the move, the patient became extremely anxious, and started to have suicidal thoughts.     He attempted to slash his left wrist in the park on the day of admission. After cutting his wrist, he called his wife who activated EMS. He endorses feeling some relief that he is still alive, but feels apprehension about his safety upon discharge. He did not engage in preparatory actions such as leaving a suicide note or giving away his belongings. He denies prior suicide attempts. Patient was recently discharged after a 1 day stay at [facility] for suicidal ideation, with an appointment to see [facility] on Thursday 7/15. Since the start of the patient's anxiety in March, he was trialed on Zoloft (dose unknown) which he discontinued after a month because he was having vivid dreams of committing suicide. He has also taken Ativan, Ambien, and Atarax with limited effect.     Since May, the patient has been experiencing sleep difficulties. He is unable to fall asleep because he is having racing thoughts about his finances. He also endorses purchasing more food than he needs, because he is worried about running out. He endorses hopelessness about the future and became tearful at times during the interview. Patient denies substance use or lifetime psychiatric treatment. Denies past NSSIB. Patient is a 72 yo male, , domiciled with wife in [place], no past psychiatric history, one recent psychiatric hospitalization for suicidal ideation at [facility], admitted to [facility] after suicide attempt via slashing his L wrist. Patient seen at bedside by team. On approach, patient is pacing around room. When the interviewer asked him to sit, he stated that he could not because of his anxiety. He reports that his anxiety started in March 2021 after moving to a new apartment with many issues. 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 His family offered to help move him and his wife to [place] this week in an effort to get away from the [place] apartment. In anticipation of the move, the patient became extremely anxious, and started to have suicidal thoughts.     He attempted to slash his left wrist in the park on the day of admission. After cutting his wrist, he called his wife who activated EMS. He endorses feeling some relief that he is still alive, but feels apprehension about his safety upon discharge. He did not engage in preparatory actions such as leaving a suicide note or giving away his belongings, but the attempt was premeditated. He denies prior suicide attempts. Patient was recently discharged after a 1 day stay at [facility] for suicidal ideation, with an appointment to see [facility] on Thursday 7/15. Since the start of the patient's anxiety in March, he was trialed on Zoloft (dose unknown) which he discontinued after a month because he was having vivid dreams of committing suicide. He has also taken Ativan, Ambien, and Atarax with limited effect.     Since May, the patient has been experiencing sleep difficulties. He is unable to fall asleep because he is having racing thoughts about his finances. He also endorses purchasing more food than he needs, because he is worried about running out. He endorses hopelessness about the future and became tearful at times during the interview. Patient denies substance use or lifetime psychiatric treatment. Denies past NSSIB.

## 2021-07-11 NOTE — BH CONSULTATION LIAISON ASSESSMENT NOTE - NSBHCONSULTRECOMMENDOTHER_PSY_A_CORE FT
Start Remeron 7.5mg QHS for sleep, Klonopin 0.5mg QHS     Admission to inpatient psychiatry service when medically cleared    [facility] Psych will follow patient Start Remeron 7.5mg QHS for sleep, Klonopin 0.5mg PO BID    Likely admission to inpatient psychiatry when medically cleared    [facility] Psych will follow patient

## 2021-07-11 NOTE — PROGRESS NOTE ADULT - SUBJECTIVE AND OBJECTIVE BOX
SURGERY DAILY PROGRESS NOTE:     SUBJECTIVE/ROS: Patient recovering well. Denies nausea, vomiting, chest pain, shortness of breath     OBJECTIVE:  Vital Signs:  Afebrile, HDS                       13.6   8.67  )-----------( 217      ( 10 Jul 2021 18:38 )             40.9     07-10    139  |  105  |  26<H>  ----------------------------<  182<H>  3.5   |  21<L>  |  1.16    IMAGING:  EXAM:  CT ANGIO UPR EXT (W)AW IC LT                        PROCEDURE DATE:  07/10/2021      INTERPRETATION:  Clinical data: 71-year-old man with laceration left wrist  COMPARISON: None.    TECHNIQUE: CT of the distal right upper extremity was performed in the arterial and venous phase. 125 cc of Omnipaque 350 intravenous contrast were administered, and 25 cc were discarded.    FINDINGS: Brachial, ulnar, and radial arteries were visualized with accompanying veins. No extravasation of contrast was demonstrated. Apparent hematoma of the thenar eminence is suspected.    No skeletal abnormality was identified.    IMPRESSION:    No contrast extravasation identified.    PHYSICAL EXAM:  Gen: NAD, well-developed  Respiratory: non-labored breathing, patent airway  Gastrointestinal: abdomen soft, nontender, nondistended  Extremities: Opsite covering inner L wrist - bandages were non-bloody, palpable ulnar and faint radial pulse, L hand well perfused, sensation intact over LLE, strength LLE and hand +5/5.  Neurological: intact   SURGERY DAILY PROGRESS NOTE:     SUBJECTIVE/ROS: Patient recovering well. Denies nausea, vomiting, chest pain, shortness of breath     OBJECTIVE:  Vital Signs:  Afebrile, HDS                       13.6   8.67  )-----------( 217      ( 10 Jul 2021 18:38 )             40.9     07-10    139  |  105  |  26<H>  ----------------------------<  182<H>  3.5   |  21<L>  |  1.16    IMAGING:  EXAM:  CT ANGIO UPR EXT (W)AW IC LT                        PROCEDURE DATE:  07/10/2021      INTERPRETATION:  Clinical data: 71-year-old man with laceration left wrist  COMPARISON: None.    TECHNIQUE: CT of the distal right upper extremity was performed in the arterial and venous phase. 125 cc of Omnipaque 350 intravenous contrast were administered, and 25 cc were discarded.    FINDINGS: Brachial, ulnar, and radial arteries were visualized with accompanying veins. No extravasation of contrast was demonstrated. Apparent hematoma of the thenar eminence is suspected.    No skeletal abnormality was identified.    IMPRESSION:    No contrast extravasation identified.    PHYSICAL EXAM:  Gen: NAD, well-developed  Respiratory: non-labored breathing, patent airway  Gastrointestinal: abdomen soft, nontender, nondistended  Extremities: Opsite covering inner L wrist - bandages were non-bloody, palpable ulnar and faint radial pulse, L hand well perfused, sensation intact over LUE, strength LUE and hand +5/5.  Neurological: intact

## 2021-07-11 NOTE — PROGRESS NOTE ADULT - ASSESSMENT
70y/o M with PMH of HTN, adjustment disorder, severe anxiety and recent psych hospitalization presents to ED after [place] L wrist [place] the park. Denies hx of depression or psychosis; no prior attempts of suicide. No hx of anxiety prior, no other somatic symptoms - no fever, palpitations, diaphoresis, CP, SOB, abd pain, N/V/D/C. Denied drug/EtOH use. Pt was taken to the OR for a repair of laceration of left radial artery, stable in the PACU. and transferred to the floor.    PLAN:  - Medication reconciliation  - ASA 81mg today per Vascular  - DVT PPX: ?  - Pain: standing Tylenol, standing Motrin  - Diet: Regular  - Bowel function: +-flatus, +-BM  - Discharge:     Trauma [id number]   72y/o M with PMH of HTN, adjustment disorder, severe anxiety and recent psych hospitalization presents to ED after cutting L wrist in the park. Denies hx of depression or psychosis; no prior attempts of suicide. No hx of anxiety prior, no other somatic symptoms - no fever, palpitations, diaphoresis, CP, SOB, abd pain, N/V/D/C. Denied drug/EtOH use. Pt was taken to the OR for a repair of laceration of left radial artery, stable in the PACU. and transferred to the floor.    PLAN:  - ASA 81mg today per Vascular  - DVT PPX: planning to hold until tomorrow 7/12  - Pain: standing Tylenol, standing Motrin  - Diet: Regular  - Bowel function: +-flatus, +-BM  - Appreciate psych consult recommendations    Trauma [id number]

## 2021-07-11 NOTE — BH CONSULTATION LIAISON ASSESSMENT NOTE - CURRENT MEDICATION
MEDICATIONS  (STANDING):  acetaminophen   Tablet .. 650 milliGRAM(s) Oral every 6 hours  aspirin  chewable 81 milliGRAM(s) Oral daily  heparin   Injectable 5000 Unit(s) SubCutaneous every 12 hours    MEDICATIONS  (PRN):  melatonin 3 milliGRAM(s) Oral at bedtime PRN Sleep  oxyCODONE    IR 5 milliGRAM(s) Oral three times a day PRN Severe Pain (7 - 10)

## 2021-07-12 ENCOUNTER — TRANSCRIPTION ENCOUNTER (OUTPATIENT)
Age: 71
End: 2021-07-12

## 2021-07-12 LAB
ALBUMIN SERPL ELPH-MCNC: 3.5 G/DL — SIGNIFICANT CHANGE UP (ref 3.3–5)
ALP SERPL-CCNC: 37 U/L — LOW (ref 40–120)
ALT FLD-CCNC: 36 U/L — SIGNIFICANT CHANGE UP (ref 10–45)
ANION GAP SERPL CALC-SCNC: 9 MMOL/L — SIGNIFICANT CHANGE UP (ref 5–17)
AST SERPL-CCNC: 51 U/L — HIGH (ref 10–40)
BASOPHILS # BLD AUTO: 0.06 K/UL — SIGNIFICANT CHANGE UP (ref 0–0.2)
BASOPHILS NFR BLD AUTO: 0.9 % — SIGNIFICANT CHANGE UP (ref 0–2)
BILIRUB SERPL-MCNC: 0.3 MG/DL — SIGNIFICANT CHANGE UP (ref 0.2–1.2)
BUN SERPL-MCNC: 19 MG/DL — SIGNIFICANT CHANGE UP (ref 7–23)
CALCIUM SERPL-MCNC: 8.3 MG/DL — LOW (ref 8.4–10.5)
CHLORIDE SERPL-SCNC: 107 MMOL/L — SIGNIFICANT CHANGE UP (ref 96–108)
CO2 SERPL-SCNC: 24 MMOL/L — SIGNIFICANT CHANGE UP (ref 22–31)
COVID-19 SPIKE DOMAIN AB INTERP: POSITIVE
COVID-19 SPIKE DOMAIN ANTIBODY RESULT: >250 U/ML — HIGH
CREAT SERPL-MCNC: 1.1 MG/DL — SIGNIFICANT CHANGE UP (ref 0.5–1.3)
EOSINOPHIL # BLD AUTO: 0.07 K/UL — SIGNIFICANT CHANGE UP (ref 0–0.5)
EOSINOPHIL NFR BLD AUTO: 1 % — SIGNIFICANT CHANGE UP (ref 0–6)
GLUCOSE SERPL-MCNC: 91 MG/DL — SIGNIFICANT CHANGE UP (ref 70–99)
HCT VFR BLD CALC: 31.7 % — LOW (ref 39–50)
HGB BLD-MCNC: 10.4 G/DL — LOW (ref 13–17)
IMM GRANULOCYTES NFR BLD AUTO: 0.4 % — SIGNIFICANT CHANGE UP (ref 0–1.5)
LYMPHOCYTES # BLD AUTO: 1.91 K/UL — SIGNIFICANT CHANGE UP (ref 1–3.3)
LYMPHOCYTES # BLD AUTO: 28.3 % — SIGNIFICANT CHANGE UP (ref 13–44)
MAGNESIUM SERPL-MCNC: 2.3 MG/DL — SIGNIFICANT CHANGE UP (ref 1.6–2.6)
MCHC RBC-ENTMCNC: 31 PG — SIGNIFICANT CHANGE UP (ref 27–34)
MCHC RBC-ENTMCNC: 32.8 GM/DL — SIGNIFICANT CHANGE UP (ref 32–36)
MCV RBC AUTO: 94.6 FL — SIGNIFICANT CHANGE UP (ref 80–100)
MONOCYTES # BLD AUTO: 0.54 K/UL — SIGNIFICANT CHANGE UP (ref 0–0.9)
MONOCYTES NFR BLD AUTO: 8 % — SIGNIFICANT CHANGE UP (ref 2–14)
NEUTROPHILS # BLD AUTO: 4.15 K/UL — SIGNIFICANT CHANGE UP (ref 1.8–7.4)
NEUTROPHILS NFR BLD AUTO: 61.4 % — SIGNIFICANT CHANGE UP (ref 43–77)
NRBC # BLD: 0 /100 WBCS — SIGNIFICANT CHANGE UP (ref 0–0)
PHOSPHATE SERPL-MCNC: 2.9 MG/DL — SIGNIFICANT CHANGE UP (ref 2.5–4.5)
PLATELET # BLD AUTO: 174 K/UL — SIGNIFICANT CHANGE UP (ref 150–400)
POTASSIUM SERPL-MCNC: 4.3 MMOL/L — SIGNIFICANT CHANGE UP (ref 3.5–5.3)
POTASSIUM SERPL-SCNC: 4.3 MMOL/L — SIGNIFICANT CHANGE UP (ref 3.5–5.3)
PROT SERPL-MCNC: 5.6 G/DL — LOW (ref 6–8.3)
RBC # BLD: 3.35 M/UL — LOW (ref 4.2–5.8)
RBC # FLD: 12.9 % — SIGNIFICANT CHANGE UP (ref 10.3–14.5)
SARS-COV-2 IGG+IGM SERPL QL IA: >250 U/ML — HIGH
SARS-COV-2 IGG+IGM SERPL QL IA: POSITIVE
SODIUM SERPL-SCNC: 140 MMOL/L — SIGNIFICANT CHANGE UP (ref 135–145)
WBC # BLD: 6.76 K/UL — SIGNIFICANT CHANGE UP (ref 3.8–10.5)
WBC # FLD AUTO: 6.76 K/UL — SIGNIFICANT CHANGE UP (ref 3.8–10.5)

## 2021-07-12 PROCEDURE — 99233 SBSQ HOSP IP/OBS HIGH 50: CPT

## 2021-07-12 PROCEDURE — 99232 SBSQ HOSP IP/OBS MODERATE 35: CPT

## 2021-07-12 RX ADMIN — Medication 650 MILLIGRAM(S): at 07:17

## 2021-07-12 RX ADMIN — MIRTAZAPINE 7.5 MILLIGRAM(S): 45 TABLET, ORALLY DISINTEGRATING ORAL at 21:18

## 2021-07-12 RX ADMIN — Medication 0.5 MILLIGRAM(S): at 17:03

## 2021-07-12 RX ADMIN — HEPARIN SODIUM 5000 UNIT(S): 5000 INJECTION INTRAVENOUS; SUBCUTANEOUS at 06:28

## 2021-07-12 RX ADMIN — HEPARIN SODIUM 5000 UNIT(S): 5000 INJECTION INTRAVENOUS; SUBCUTANEOUS at 17:03

## 2021-07-12 RX ADMIN — Medication 0.5 MILLIGRAM(S): at 06:27

## 2021-07-12 RX ADMIN — Medication 650 MILLIGRAM(S): at 13:17

## 2021-07-12 RX ADMIN — Medication 81 MILLIGRAM(S): at 13:17

## 2021-07-12 RX ADMIN — Medication 650 MILLIGRAM(S): at 17:03

## 2021-07-12 NOTE — PROGRESS NOTE ADULT - ATTENDING COMMENTS
left wrist looks good with good motor sensory,  good perfusion clinically  focus of care at this stage is to seek placement with psychiatric input
Patient seen and examined and agree with above.   s/p left radial artery repair after suicide attempt.   The patient seen by psychiatry today with MDD vs Adjustment disorder with anxiety and depression. Will likely benefit from inpatient psych admission once medically cleared.  Patient with good perfusion to LUE.   Pain controlled.  Will monitor vascular checks and plan for discharge.

## 2021-07-12 NOTE — PROGRESS NOTE ADULT - ASSESSMENT
70y/o M with PMH of HTN, adjustment disorder, severe anxiety and recent psych hospitalization presents to ED after [place] L wrist in the park. Denies hx of depression or psychosis; no prior attempts of suicide. No hx of anxiety prior, no other somatic symptoms - no fever, palpitations, diaphoresis, CP, SOB, abd pain, N/V/D/C. Denied drug/EtOH use. Pt was taken to the OR 7/10 for a repair of laceration of left radial artery, stable in the PACU. and transferred to the floor.    PLAN:  - ASA 81mg today per Vascular  - DVT PPX: planning to hold until tomorrow 7/12  - Pain: standing Tylenol, standing Motrin  - Diet: Regular  - Bowel function: +-flatus, +-BM  - Appreciate psych consult recommendations    Trauma [id number]

## 2021-07-12 NOTE — PROGRESS NOTE ADULT - SUBJECTIVE AND OBJECTIVE BOX
SURGERY DAILY PROGRESS NOTE:     SUBJECTIVE/ROS: Patient seen and examined on morning rounds. He feels well. pain is controlled     MEDICATIONS  (STANDING):  acetaminophen   Tablet .. 650 milliGRAM(s) Oral every 6 hours  aspirin  chewable 81 milliGRAM(s) Oral daily  clonazePAM  Tablet 0.5 milliGRAM(s) Oral two times a day  heparin   Injectable 5000 Unit(s) SubCutaneous every 12 hours  mirtazapine 7.5 milliGRAM(s) Oral at bedtime    MEDICATIONS  (PRN):  melatonin 3 milliGRAM(s) Oral at bedtime PRN Sleep  oxyCODONE    IR 5 milliGRAM(s) Oral three times a day PRN Severe Pain (7 - 10)      OBJECTIVE:    Vital Signs Last 24 Hrs  T(C): 37 (12 Jul 2021 09:34), Max: 37.5 (11 Jul 2021 20:46)  T(F): 98.6 (12 Jul 2021 09:34), Max: 99.5 (11 Jul 2021 20:46)  HR: 79 (12 Jul 2021 09:34) (56 - 97)  BP: 119/69 (12 Jul 2021 09:34) (113/67 - 144/73)  BP(mean): --  RR: 18 (12 Jul 2021 09:34) (18 - 20)  SpO2: 98% (12 Jul 2021 09:34) (97% - 98%)        I&O's Detail    11 Jul 2021 07:01  -  12 Jul 2021 07:00  --------------------------------------------------------  IN:    Oral Fluid: 1890 mL  Total IN: 1890 mL    OUT:    Voided (mL): 850 mL  Total OUT: 850 mL    Total NET: 1040 mL      12 Jul 2021 07:01  -  12 Jul 2021 11:44  --------------------------------------------------------  IN:    Oral Fluid: 360 mL  Total IN: 360 mL    OUT:  Total OUT: 0 mL    Total NET: 360 mL          Daily     Daily     LABS:                        10.4   6.76  )-----------( 174      ( 12 Jul 2021 07:03 )             31.7     07-12    140  |  107  |  19  ----------------------------<  91  4.3   |  24  |  1.10    Ca    8.3<L>      12 Jul 2021 07:03  Phos  2.9     07-12  Mg     2.3     07-12    TPro  5.6<L>  /  Alb  3.5  /  TBili  0.3  /  DBili  x   /  AST  51<H>  /  ALT  36  /  AlkPhos  37<L>  07-12    PT/INR - ( 10 Jul 2021 18:38 )   PT: 12.2 sec;   INR: 1.02 ratio         PTT - ( 10 Jul 2021 18:38 )  PTT:25.0 sec              PHYSICAL EXAM:    Gen: NAD, well-developed  Respiratory: non-labored breathing, patent airway  Gastrointestinal: abdomen soft, nontender, nondistended  Extremities: Opsite covering inner L wrist - bandages were non-bloody, palpable ulnar and faint radial pulse, L hand well perfused, sensation intact over LUE, strength LUE and hand +5/5.  Neurological: intact

## 2021-07-12 NOTE — DISCHARGE NOTE PROVIDER - HOSPITAL COURSE
72y/o M with PMH of HTN presents to ED [place]. Per wife, family moved 3wks ago and since then pt has had difficulty with the changes in his life. 1 wk ago he was admitted to [facility] for 1d to psychiatry mon for threatening to commit suicide. EMS called to [place] after pt cut his L radial wrist with a knife. Dx on dc was adjustment disorder and severe anxiety. Pt has no psych hx, lost job during pandemic, family planning further move to [place]. Never had depression or psychosis; no prior attempts of suicide. No hx of anxiety prior, no other somatic symptoms - no fever, palpitations, diaphoresis, CP, SOB, abd pain, N/V/D/C. Denied drug/EtOH use. Wife said he never was violent, she feels safe at home w/ him at baseline. Patient seen in ED, pressure dressing removed and noted to have pulsatile bleeding.    Pt admitted to [facility]. On 7/10 sp OR for repair of laceration of left radial artery by vascular. The patient tolerated the operation well and there were no post-operative complications identified. 7/11 started on asa as per vascular. Evaluated by psych- likely episode of major depressive disorder with suicide attempt vs. adjustment disorder with anxiety and depression with suicide attempt; recommended to start Remeron 7.5mg QHS for sleep and Klonopin 0.5mg PO BID; will likely need admission to inpatient psychiatry when medically cleared.    On day of discharge, the patient was tolerating diet, ambulating well and pain was controlled.    Pt planned for discharge to.............    Pt to follow up with Dr Good and PMD within 1-2 wks of discharge. 70y/o M with PMH of HTN presents to ED [place] park. Per wife, family moved 3wks ago and since then pt has had difficulty with the changes in his life. 1 wk ago he was admitted to [facility] for 1d to psychiatry mon for threatening to commit suicide. EMS called to [place] after pt cut his L radial wrist with a knife. Dx on dc was adjustment disorder and severe anxiety. Pt has no psych hx, lost job during pandemic, family planning further move to [place]. Never had depression or psychosis; no prior attempts of suicide. No hx of anxiety prior, no other somatic symptoms - no fever, palpitations, diaphoresis, CP, SOB, abd pain, N/V/D/C. Denied drug/EtOH use. Wife said he never was violent, she feels safe at home w/ him at baseline. Patient seen in ED, pressure dressing removed and noted to have pulsatile bleeding.    Pt admitted to [facility]. On 7/10 sp OR for repair of laceration of left radial artery by vascular. The patient tolerated the operation well and there were no post-operative complications identified. 7/11 started on asa as per vascular. Evaluated by psych- likely episode of major depressive disorder with suicide attempt vs. adjustment disorder with anxiety and depression with suicide attempt; recommended to start Remeron 7.5mg QHS for sleep and Klonopin 0.5mg PO BID; will likely need admission to inpatient psychiatry when medically cleared.    On day of discharge, the patient was tolerating diet, ambulating well and pain was controlled.    Pt planned for discharge to inpatient psych.   Pt to follow up with Dr Good and PMD within 1-2 wks of discharge.

## 2021-07-12 NOTE — DISCHARGE NOTE PROVIDER - NSDCMRMEDTOKEN_GEN_ALL_CORE_FT
acetaminophen 325 mg oral tablet: 2 tab(s) orally every 6 hours  aspirin 81 mg oral tablet, chewable: 1 tab(s) orally once a day  clonazePAM 0.5 mg oral tablet: 1 tab(s) orally 2 times a day  melatonin 3 mg oral tablet: 1 tab(s) orally once a day (at bedtime), As needed, Sleep  mirtazapine 7.5 mg oral tablet: 1 tab(s) orally once a day (at bedtime)  oxyCODONE 5 mg oral tablet: 1 tab(s) orally 3 times a day, As needed, Severe Pain (7 - 10)   acetaminophen 325 mg oral tablet: 2 tab(s) orally every 6 hours  amlodipine-benazepril 5 mg-20 mg oral capsule: 1 cap(s) orally once a day  aspirin 81 mg oral tablet, chewable: 1 tab(s) orally once a day  clonazePAM 0.5 mg oral tablet: 1 tab(s) orally 2 times a day  lovastatin 20 mg oral tablet: 1 tab(s) orally once a day  melatonin 3 mg oral tablet: 1 tab(s) orally once a day (at bedtime), As needed, Sleep  mirtazapine 7.5 mg oral tablet: 1 tab(s) orally once a day (at bedtime)  oxyCODONE 5 mg oral tablet: 1 tab(s) orally 3 times a day, As needed, Severe Pain (7 - 10)

## 2021-07-12 NOTE — DISCHARGE NOTE PROVIDER - NSDCCPTREATMENT_GEN_ALL_CORE_FT
PRINCIPAL PROCEDURE  Procedure: Repair of laceration of left radial artery  Findings and Treatment: status post repair of laceration of left radial artery  WOUND CARE: keep dressing clean and dry   BATHING: Please do not submerge wound underwater. You may shower and/or sponge bathe.  ACTIVITY: No heavy lifting anything more than 10-15lbs or straining with right arm.  If you are taking narcotic pain medication (such as Percocet), do NOT drive a car, operate machinery or make important decisions.  DIET: regular diet  NOTIFY YOUR SURGEON IF: You have any bleeding that does not stop, any pus draining from your wound, any fever (over 100.4 F) or chills, or if your pain is not controlled on your discharge pain medications.  FOLLOW-UP:  1. Please call to make a follow-up appointment with Dr Good within 1-2 wks of discharge   2. Please follow up with your primary care physician in 1-2 wks regarding your hospitalization.       PRINCIPAL PROCEDURE  Procedure: Repair of laceration of left radial artery  Findings and Treatment: status post repair of laceration of left radial artery  WOUND CARE: keep dressing clean and dry   BATHING: Please do not submerge wound underwater. You may shower and/or sponge bathe.  ACTIVITY: No heavy lifting anything more than 10-15lbs or straining with right arm.  If you are taking narcotic pain medication (such as Percocet), do NOT drive a car, operate machinery or make important decisions.  NOTIFY YOUR SURGEON IF: You have any bleeding that does not stop, any pus draining from your wound, any fever (over 100.4 F) or chills, or if your pain is not controlled on your discharge pain medications.  FOLLOW-UP:  1. Please call to make a follow-up appointment with Dr Good within 1-2 wks of discharge   2. Please follow up with your primary care physician in 1-2 wks regarding your hospitalization.

## 2021-07-12 NOTE — DISCHARGE NOTE PROVIDER - NSDCFUADDINST_GEN_ALL_CORE_FT
Your PMD--Please call for a follow up appointment in the next 1-2 weeks regarding your recent surgery/hospitalization.

## 2021-07-12 NOTE — DISCHARGE NOTE PROVIDER - NSDCCPCAREPLAN_GEN_ALL_CORE_FT
PRINCIPAL DISCHARGE DIAGNOSIS  Diagnosis: Radial artery injury, left, initial encounter  Assessment and Plan of Treatment: status post repair  pain control as needed  continue aspirin as prescribed      SECONDARY DISCHARGE DIAGNOSES  Diagnosis: Attempted suicide  Assessment and Plan of Treatment: evaluated by psychiatry   started on remeron 7.5mg at bedtime for sleep  started on klonopin  0.5mg PO BID  planned for inpatient psychiatry admission

## 2021-07-12 NOTE — DISCHARGE NOTE PROVIDER - NSDCACTIVITY_GEN_ALL_CORE
Showering allowed/Walking - Indoors allowed/Walking - Outdoors allowed Showering allowed/Walking - Indoors allowed/No heavy lifting/straining/Walking - Outdoors allowed

## 2021-07-12 NOTE — DISCHARGE NOTE PROVIDER - CARE PROVIDER_API CALL
Sly Good)  Vascular Surgery  [address], [address]  [place] [address]  Phone: [phone]  Fax: [phone]  Follow Up Time: 2 weeks

## 2021-07-13 ENCOUNTER — TRANSCRIPTION ENCOUNTER (OUTPATIENT)
Age: 71
End: 2021-07-13

## 2021-07-13 VITALS
OXYGEN SATURATION: 94 % | SYSTOLIC BLOOD PRESSURE: 158 MMHG | RESPIRATION RATE: 18 BRPM | HEART RATE: 83 BPM | TEMPERATURE: 98 F | DIASTOLIC BLOOD PRESSURE: 71 MMHG

## 2021-07-13 LAB — SARS-COV-2 RNA SPEC QL NAA+PROBE: SIGNIFICANT CHANGE UP

## 2021-07-13 PROCEDURE — 80053 COMPREHEN METABOLIC PANEL: CPT

## 2021-07-13 PROCEDURE — 86769 SARS-COV-2 COVID-19 ANTIBODY: CPT

## 2021-07-13 PROCEDURE — U0003: CPT

## 2021-07-13 PROCEDURE — 86900 BLOOD TYPING SEROLOGIC ABO: CPT

## 2021-07-13 PROCEDURE — U0005: CPT

## 2021-07-13 PROCEDURE — 85610 PROTHROMBIN TIME: CPT

## 2021-07-13 PROCEDURE — 99231 SBSQ HOSP IP/OBS SF/LOW 25: CPT

## 2021-07-13 PROCEDURE — 86901 BLOOD TYPING SEROLOGIC RH(D): CPT

## 2021-07-13 PROCEDURE — 80048 BASIC METABOLIC PNL TOTAL CA: CPT

## 2021-07-13 PROCEDURE — 85027 COMPLETE CBC AUTOMATED: CPT

## 2021-07-13 PROCEDURE — 84100 ASSAY OF PHOSPHORUS: CPT

## 2021-07-13 PROCEDURE — 86850 RBC ANTIBODY SCREEN: CPT

## 2021-07-13 PROCEDURE — 73206 CT ANGIO UPR EXTRM W/O&W/DYE: CPT

## 2021-07-13 PROCEDURE — 85730 THROMBOPLASTIN TIME PARTIAL: CPT

## 2021-07-13 PROCEDURE — 80307 DRUG TEST PRSMV CHEM ANLYZR: CPT

## 2021-07-13 PROCEDURE — 85025 COMPLETE CBC W/AUTO DIFF WBC: CPT

## 2021-07-13 PROCEDURE — C9399: CPT

## 2021-07-13 PROCEDURE — 99291 CRITICAL CARE FIRST HOUR: CPT | Mod: 25

## 2021-07-13 PROCEDURE — C1889: CPT

## 2021-07-13 PROCEDURE — 83735 ASSAY OF MAGNESIUM: CPT

## 2021-07-13 RX ORDER — AMLODIPINE BESYLATE 2.5 MG/1
5 TABLET ORAL EVERY 24 HOURS
Refills: 0 | Status: DISCONTINUED | OUTPATIENT
Start: 2021-07-13 | End: 2021-07-13

## 2021-07-13 RX ORDER — ACETAMINOPHEN 500 MG
2 TABLET ORAL
Qty: 0 | Refills: 0 | DISCHARGE
Start: 2021-07-13

## 2021-07-13 RX ORDER — LISINOPRIL 2.5 MG/1
20 TABLET ORAL DAILY
Refills: 0 | Status: DISCONTINUED | OUTPATIENT
Start: 2021-07-13 | End: 2021-07-13

## 2021-07-13 RX ORDER — ASPIRIN/CALCIUM CARB/MAGNESIUM 324 MG
1 TABLET ORAL
Qty: 0 | Refills: 0 | DISCHARGE
Start: 2021-07-13

## 2021-07-13 RX ORDER — MIRTAZAPINE 45 MG/1
1 TABLET, ORALLY DISINTEGRATING ORAL
Qty: 0 | Refills: 0 | DISCHARGE
Start: 2021-07-13

## 2021-07-13 RX ORDER — CLONAZEPAM 1 MG
1 TABLET ORAL
Qty: 0 | Refills: 0 | DISCHARGE
Start: 2021-07-13

## 2021-07-13 RX ORDER — OXYCODONE HYDROCHLORIDE 5 MG/1
1 TABLET ORAL
Qty: 0 | Refills: 0 | DISCHARGE
Start: 2021-07-13

## 2021-07-13 RX ORDER — ATORVASTATIN CALCIUM 80 MG/1
10 TABLET, FILM COATED ORAL AT BEDTIME
Refills: 0 | Status: DISCONTINUED | OUTPATIENT
Start: 2021-07-13 | End: 2021-07-13

## 2021-07-13 RX ORDER — LANOLIN ALCOHOL/MO/W.PET/CERES
1 CREAM (GRAM) TOPICAL
Qty: 0 | Refills: 0 | DISCHARGE
Start: 2021-07-13

## 2021-07-13 RX ADMIN — Medication 650 MILLIGRAM(S): at 11:15

## 2021-07-13 RX ADMIN — Medication 0.5 MILLIGRAM(S): at 05:41

## 2021-07-13 RX ADMIN — Medication 650 MILLIGRAM(S): at 05:41

## 2021-07-13 RX ADMIN — AMLODIPINE BESYLATE 5 MILLIGRAM(S): 2.5 TABLET ORAL at 14:45

## 2021-07-13 RX ADMIN — Medication 81 MILLIGRAM(S): at 11:16

## 2021-07-13 RX ADMIN — LISINOPRIL 20 MILLIGRAM(S): 2.5 TABLET ORAL at 14:45

## 2021-07-13 RX ADMIN — HEPARIN SODIUM 5000 UNIT(S): 5000 INJECTION INTRAVENOUS; SUBCUTANEOUS at 05:41

## 2021-07-13 NOTE — BH CONSULTATION LIAISON PROGRESS NOTE - NSBHCONSULTRECOMMENDOTHER_PSY_A_CORE FT
continue Remeron 7.5mg QHS for sleep, Klonopin 0.5mg PO BID    will require transfer to inpatient psychiatry when medically cleared    [facility] will follow patient
C/w Remeron 7.5mg QHS for sleep, Klonopin 0.5mg PO BID    will require transfer to inpatient psychiatry when medically cleared    [facility] will follow patient

## 2021-07-13 NOTE — BH CONSULTATION LIAISON PROGRESS NOTE - NSBHCHARTREVIEWVS_PSY_A_CORE FT
Vital Signs Last 24 Hrs  T(C): 37 (12 Jul 2021 09:34), Max: 37.5 (11 Jul 2021 20:46)  T(F): 98.6 (12 Jul 2021 09:34), Max: 99.5 (11 Jul 2021 20:46)  HR: 79 (12 Jul 2021 09:34) (56 - 97)  BP: 119/69 (12 Jul 2021 09:34) (113/67 - 144/73)  BP(mean): --  RR: 18 (12 Jul 2021 09:34) (18 - 20)  SpO2: 98% (12 Jul 2021 09:34) (97% - 98%)
Vital Signs Last 24 Hrs  T(C): 36.9 (13 Jul 2021 14:00), Max: 37.1 (12 Jul 2021 21:29)  T(F): 98.5 (13 Jul 2021 14:00), Max: 98.7 (12 Jul 2021 21:29)  HR: 90 (13 Jul 2021 14:00) (68 - 90)  BP: 148/84 (13 Jul 2021 14:00) (144/80 - 158/79)  BP(mean): --  RR: 17 (13 Jul 2021 14:00) (16 - 18)  SpO2: 97% (13 Jul 2021 14:00) (97% - 98%)

## 2021-07-13 NOTE — PROGRESS NOTE ADULT - SUBJECTIVE AND OBJECTIVE BOX
SURGERY DAILY PROGRESS NOTE:     SUBJECTIVE/ROS: Patient feels well, denies pain  Denies nausea, vomiting, chest pain, shortness of breath     MEDICATIONS  (STANDING):  acetaminophen   Tablet .. 650 milliGRAM(s) Oral every 6 hours  aspirin  chewable 81 milliGRAM(s) Oral daily  clonazePAM  Tablet 0.5 milliGRAM(s) Oral two times a day  heparin   Injectable 5000 Unit(s) SubCutaneous every 12 hours  mirtazapine 7.5 milliGRAM(s) Oral at bedtime    MEDICATIONS  (PRN):  melatonin 3 milliGRAM(s) Oral at bedtime PRN Sleep  oxyCODONE    IR 5 milliGRAM(s) Oral three times a day PRN Severe Pain (7 - 10)      OBJECTIVE:    Vital Signs Last 24 Hrs  T(C): 36.8 (13 Jul 2021 05:29), Max: 37.1 (12 Jul 2021 21:29)  T(F): 98.2 (13 Jul 2021 05:29), Max: 98.7 (12 Jul 2021 21:29)  HR: 68 (13 Jul 2021 05:29) (68 - 80)  BP: 152/89 (13 Jul 2021 05:29) (134/74 - 158/79)  BP(mean): --  RR: 17 (13 Jul 2021 05:29) (16 - 18)  SpO2: 98% (13 Jul 2021 05:29) (97% - 98%)        I&O's Detail    12 Jul 2021 07:01  -  13 Jul 2021 07:00  --------------------------------------------------------  IN:    Oral Fluid: 1680 mL  Total IN: 1680 mL    OUT:  Total OUT: 0 mL    Total NET: 1680 mL          Daily     Daily     LABS:                        10.4   6.76  )-----------( 174      ( 12 Jul 2021 07:03 )             31.7     07-12    140  |  107  |  19  ----------------------------<  91  4.3   |  24  |  1.10    Ca    8.3<L>      12 Jul 2021 07:03  Phos  2.9     07-12  Mg     2.3     07-12    TPro  5.6<L>  /  Alb  3.5  /  TBili  0.3  /  DBili  x   /  AST  51<H>  /  ALT  36  /  AlkPhos  37<L>  07-12                  PHYSICAL EXAM:    Gen: NAD, well-developed  Respiratory: non-labored breathing, patent airway  Gastrointestinal: abdomen soft, nontender, nondistended  Extremities: Opsite covering inner L wrist - bandages were non-bloody, palpable ulnar and faint radial pulse, L hand well perfused, sensation intact over LUE, strength LUE and hand +5/5.  Neurological: intact

## 2021-07-13 NOTE — BH CONSULTATION LIAISON PROGRESS NOTE - NSBHCHARTREVIEWINVESTIGATE_PSY_A_CORE FT
< from: 12 Lead ECG (07.10.21 @ 21:30) >      Ventricular Rate 94 BPM    Atrial Rate 94 BPM    P-R Interval 140 ms    QRS Duration 96 ms    Q-T Interval 378 ms    QTC Calculation(Bazett) 472 ms    P Axis 22 degrees    R Axis -68 degrees    T Axis 48 degrees    Diagnosis Line NORMAL SINUS RHYTHM  INCOMPLETE RIGHT BUNDLE BRANCH BLOCK  LEFT ANTERIOR FASCICULAR BLOCK  ANTERIOR INFARCT , AGE UNDETERMINED  ABNORMAL ECG  NO PREVIOUS ECGS AVAILABLE  Confirmed by CHELSIE JACOB, MAXINE ([id number]) on 7/13/2021 7:14:11 AM    < end of copied text >

## 2021-07-13 NOTE — BH CONSULTATION LIAISON PROGRESS NOTE - NSBHCHARTREVIEWLAB_PSY_A_CORE FT
10.4   6.76  )-----------( 174      ( 12 Jul 2021 07:03 )             31.7     07-12    140  |  107  |  19  ----------------------------<  91  4.3   |  24  |  1.10    Ca    8.3<L>      12 Jul 2021 07:03  Phos  2.9     07-12  Mg     2.3     07-12    TPro  5.6<L>  /  Alb  3.5  /  TBili  0.3  /  DBili  x   /  AST  51<H>  /  ALT  36  /  AlkPhos  37<L>  07-12

## 2021-07-13 NOTE — PROGRESS NOTE ADULT - ASSESSMENT
70y/o M with PMH of HTN, adjustment disorder, severe anxiety and recent psych hospitalization [place]. Denies hx of depression or psychosis; no prior attempts of suicide. No hx of anxiety prior, no other somatic symptoms - no fever, palpitations, diaphoresis, CP, SOB, abd pain, N/V/D/C. Denied drug/EtOH use. Pt was taken to the OR 7/10 for a repair of laceration of left radial artery.    PLAN:  - ASA 81mg today per Vascular  - DVT PPX: planning to hold until tomorrow 7/12  - Pain: standing Tylenol, standing Motrin  - Diet: Regular  - Bowel function: +-flatus, +-BM  - Appreciate psych consult recommendations  - dc planning to psych facility     Trauma [id number]

## 2021-07-13 NOTE — BH CONSULTATION LIAISON PROGRESS NOTE - NSBHFUPINTERVALHXFT_PSY_A_CORE
pt seen, s/p left wrist laceration via suicide attempt, requiring sutures. pt remains depressed and anxious, but does feel slight improvement on klonopin and remeron, started last night. pt feels remorse regarding recent suicide attempt, aware he requires further help for depression. currently denies si/hi, but does admit he tried to commit suicide via wrist laceration on saturday due to worsening anxiety/depression. remains on 1:1.
Pt reports initially having better sleep/anxiety with Klonopin, but now feeling worse again, anxious about transfer to [facility] and having his clothing brought to him because his wife can't drive.  Evasive about SI, but states he is contemplating the move to [place], his family supports this change.  No AVH/paranoia, no acute behavioral issues per 1:1.  Pt restless, sitting up and down in anxious/restless manner.

## 2021-07-13 NOTE — DISCHARGE NOTE NURSING/CASE MANAGEMENT/SOCIAL WORK - PATIENT PORTAL LINK FT
You can access the FollowMyHealth Patient Portal offered by [facility] by registering at the following website: [email or web address]. By joining [facility]’s FollowMyHealth portal, you will also be able to view your health information using other applications (apps) compatible with our system.

## 2021-07-14 LAB
AMPHET UR-MCNC: NEGATIVE — SIGNIFICANT CHANGE UP
BARBITURATES, URINE.: NEGATIVE — SIGNIFICANT CHANGE UP
BENZODIAZ UR-MCNC: NEGATIVE — SIGNIFICANT CHANGE UP
COCAINE METAB.OTHER UR-MCNC: NEGATIVE — SIGNIFICANT CHANGE UP
CREATININE, URINE THERAPEUTIC: 190.7 MG/DL — SIGNIFICANT CHANGE UP
METHADONE UR-MCNC: NEGATIVE — SIGNIFICANT CHANGE UP
METHAQUALONE UR QL: NEGATIVE — SIGNIFICANT CHANGE UP
METHAQUALONE UR-MCNC: NEGATIVE — SIGNIFICANT CHANGE UP
OPIATES UR-MCNC: NEGATIVE — SIGNIFICANT CHANGE UP
PCP UR-MCNC: NEGATIVE — SIGNIFICANT CHANGE UP
PROPOXYPH UR QL: NEGATIVE — SIGNIFICANT CHANGE UP
THC UR QL: NEGATIVE — SIGNIFICANT CHANGE UP

## 2021-07-23 RX ORDER — LOVASTATIN 20 MG
1 TABLET ORAL
Qty: 0 | Refills: 0 | DISCHARGE

## 2021-07-23 RX ORDER — AMLODIPINE BESYLATE AND BENAZEPRIL HYDROCHLORIDE 10; 20 MG/1; MG/1
1 CAPSULE ORAL
Qty: 0 | Refills: 0 | DISCHARGE

## 2021-07-26 ENCOUNTER — APPOINTMENT (OUTPATIENT)
Dept: UROLOGY | Facility: CLINIC | Age: 71
End: 2021-07-26

## 2021-08-02 ENCOUNTER — APPOINTMENT (OUTPATIENT)
Dept: UROLOGY | Facility: CLINIC | Age: 71
End: 2021-08-02

## 2021-08-03 ENCOUNTER — OUTPATIENT (OUTPATIENT)
Dept: OUTPATIENT SERVICES | Facility: HOSPITAL | Age: 71
LOS: 1 days | Discharge: HOME | End: 2021-08-03
Payer: MEDICARE

## 2021-08-03 PROCEDURE — 90792 PSYCH DIAG EVAL W/MED SRVCS: CPT

## 2021-08-04 DIAGNOSIS — F32.9 MAJOR DEPRESSIVE DISORDER, SINGLE EPISODE, UNSPECIFIED: ICD-10-CM

## 2021-08-04 DIAGNOSIS — F41.9 ANXIETY DISORDER, UNSPECIFIED: ICD-10-CM

## 2021-08-09 PROCEDURE — 99212 OFFICE O/P EST SF 10 MIN: CPT

## 2021-08-09 PROCEDURE — 99214 OFFICE O/P EST MOD 30 MIN: CPT

## 2021-08-25 NOTE — BH CONSULTATION LIAISON PROGRESS NOTE - CURRENT MEDICATION
R/O Anxiety
MEDICATIONS  (STANDING):  acetaminophen   Tablet .. 650 milliGRAM(s) Oral every 6 hours  aspirin  chewable 81 milliGRAM(s) Oral daily  clonazePAM  Tablet 0.5 milliGRAM(s) Oral two times a day  heparin   Injectable 5000 Unit(s) SubCutaneous every 12 hours  mirtazapine 7.5 milliGRAM(s) Oral at bedtime    MEDICATIONS  (PRN):  melatonin 3 milliGRAM(s) Oral at bedtime PRN Sleep  oxyCODONE    IR 5 milliGRAM(s) Oral three times a day PRN Severe Pain (7 - 10)  
MEDICATIONS  (STANDING):  acetaminophen   Tablet .. 650 milliGRAM(s) Oral every 6 hours  amLODIPine   Tablet 5 milliGRAM(s) Oral every 24 hours  aspirin  chewable 81 milliGRAM(s) Oral daily  atorvastatin 10 milliGRAM(s) Oral at bedtime  clonazePAM  Tablet 0.5 milliGRAM(s) Oral two times a day  heparin   Injectable 5000 Unit(s) SubCutaneous every 12 hours  lisinopril 20 milliGRAM(s) Oral daily  mirtazapine 7.5 milliGRAM(s) Oral at bedtime    MEDICATIONS  (PRN):  melatonin 3 milliGRAM(s) Oral at bedtime PRN Sleep  oxyCODONE    IR 5 milliGRAM(s) Oral three times a day PRN Severe Pain (7 - 10)

## 2021-08-26 PROCEDURE — 99214 OFFICE O/P EST MOD 30 MIN: CPT

## 2021-09-01 PROCEDURE — 99214 OFFICE O/P EST MOD 30 MIN: CPT

## 2021-09-10 PROCEDURE — 99214 OFFICE O/P EST MOD 30 MIN: CPT

## 2021-09-12 PROBLEM — I10 ESSENTIAL (PRIMARY) HYPERTENSION: Chronic | Status: ACTIVE | Noted: 2021-07-10

## 2021-09-13 PROCEDURE — 99214 OFFICE O/P EST MOD 30 MIN: CPT

## 2021-09-23 ENCOUNTER — OUTPATIENT (OUTPATIENT)
Dept: OUTPATIENT SERVICES | Facility: HOSPITAL | Age: 71
LOS: 1 days | Discharge: HOME | End: 2021-09-23
Payer: MEDICARE

## 2021-09-23 PROCEDURE — 90792 PSYCH DIAG EVAL W/MED SRVCS: CPT | Mod: 95

## 2021-09-24 DIAGNOSIS — F41.9 ANXIETY DISORDER, UNSPECIFIED: ICD-10-CM

## 2021-10-14 PROCEDURE — 99214 OFFICE O/P EST MOD 30 MIN: CPT | Mod: 95

## 2021-11-11 PROCEDURE — 99214 OFFICE O/P EST MOD 30 MIN: CPT | Mod: 95

## 2021-12-09 PROCEDURE — 99214 OFFICE O/P EST MOD 30 MIN: CPT | Mod: 95

## 2022-01-06 PROCEDURE — 99214 OFFICE O/P EST MOD 30 MIN: CPT | Mod: 95

## 2022-02-17 PROCEDURE — 99214 OFFICE O/P EST MOD 30 MIN: CPT | Mod: 95

## 2022-03-07 PROCEDURE — 99442: CPT | Mod: 95

## 2022-03-14 PROCEDURE — 99214 OFFICE O/P EST MOD 30 MIN: CPT | Mod: 95

## 2022-03-29 PROCEDURE — 99214 OFFICE O/P EST MOD 30 MIN: CPT | Mod: 95

## 2022-04-13 PROCEDURE — 99214 OFFICE O/P EST MOD 30 MIN: CPT | Mod: 95

## 2022-04-29 PROCEDURE — 99214 OFFICE O/P EST MOD 30 MIN: CPT | Mod: 95

## 2022-05-26 PROCEDURE — 99214 OFFICE O/P EST MOD 30 MIN: CPT | Mod: 95

## 2022-05-31 PROCEDURE — 99214 OFFICE O/P EST MOD 30 MIN: CPT | Mod: 95

## 2022-08-11 PROCEDURE — 99214 OFFICE O/P EST MOD 30 MIN: CPT | Mod: 95

## 2022-08-24 PROCEDURE — 90834 PSYTX W PT 45 MINUTES: CPT | Mod: 95

## 2022-09-08 PROCEDURE — 99214 OFFICE O/P EST MOD 30 MIN: CPT | Mod: 95

## 2022-09-20 PROCEDURE — 99214 OFFICE O/P EST MOD 30 MIN: CPT | Mod: 95

## 2022-09-26 ENCOUNTER — INPATIENT (INPATIENT)
Facility: HOSPITAL | Age: 72
LOS: 10 days | Discharge: HOME | End: 2022-10-07
Attending: PSYCHIATRY & NEUROLOGY | Admitting: PSYCHIATRY & NEUROLOGY

## 2022-09-26 VITALS
SYSTOLIC BLOOD PRESSURE: 178 MMHG | WEIGHT: 160.06 LBS | HEIGHT: 69 IN | TEMPERATURE: 98 F | HEART RATE: 110 BPM | DIASTOLIC BLOOD PRESSURE: 90 MMHG

## 2022-09-26 DIAGNOSIS — F32.9 MAJOR DEPRESSIVE DISORDER, SINGLE EPISODE, UNSPECIFIED: ICD-10-CM

## 2022-09-26 DIAGNOSIS — F33.9 MAJOR DEPRESSIVE DISORDER, RECURRENT, UNSPECIFIED: ICD-10-CM

## 2022-09-26 DIAGNOSIS — E78.5 HYPERLIPIDEMIA, UNSPECIFIED: ICD-10-CM

## 2022-09-26 DIAGNOSIS — I10 ESSENTIAL (PRIMARY) HYPERTENSION: ICD-10-CM

## 2022-09-26 DIAGNOSIS — K64.4 RESIDUAL HEMORRHOIDAL SKIN TAGS: ICD-10-CM

## 2022-09-26 LAB
ALBUMIN SERPL ELPH-MCNC: 4.6 G/DL — SIGNIFICANT CHANGE UP (ref 3.3–5)
ALP SERPL-CCNC: 70 U/L — SIGNIFICANT CHANGE UP (ref 40–120)
ALT FLD-CCNC: 21 U/L — SIGNIFICANT CHANGE UP (ref 4–41)
ANION GAP SERPL CALC-SCNC: 13 MMOL/L — SIGNIFICANT CHANGE UP (ref 7–14)
AST SERPL-CCNC: 22 U/L — SIGNIFICANT CHANGE UP (ref 4–40)
B PERT DNA SPEC QL NAA+PROBE: SIGNIFICANT CHANGE UP
B PERT+PARAPERT DNA PNL SPEC NAA+PROBE: SIGNIFICANT CHANGE UP
BILIRUB SERPL-MCNC: 0.3 MG/DL — SIGNIFICANT CHANGE UP (ref 0.2–1.2)
BORDETELLA PARAPERTUSSIS (RAPRVP): SIGNIFICANT CHANGE UP
BUN SERPL-MCNC: 22 MG/DL — SIGNIFICANT CHANGE UP (ref 7–23)
C PNEUM DNA SPEC QL NAA+PROBE: SIGNIFICANT CHANGE UP
CALCIUM SERPL-MCNC: 9.1 MG/DL — SIGNIFICANT CHANGE UP (ref 8.4–10.5)
CHLORIDE SERPL-SCNC: 105 MMOL/L — SIGNIFICANT CHANGE UP (ref 98–107)
CHOLEST SERPL-MCNC: 154 MG/DL — SIGNIFICANT CHANGE UP
CO2 SERPL-SCNC: 24 MMOL/L — SIGNIFICANT CHANGE UP (ref 22–31)
CREAT SERPL-MCNC: 1.01 MG/DL — SIGNIFICANT CHANGE UP (ref 0.5–1.3)
EGFR: 79 ML/MIN/1.73M2 — SIGNIFICANT CHANGE UP
FLUAV SUBTYP SPEC NAA+PROBE: SIGNIFICANT CHANGE UP
FLUBV RNA SPEC QL NAA+PROBE: SIGNIFICANT CHANGE UP
GLUCOSE SERPL-MCNC: 112 MG/DL — HIGH (ref 70–99)
HADV DNA SPEC QL NAA+PROBE: SIGNIFICANT CHANGE UP
HCOV 229E RNA SPEC QL NAA+PROBE: SIGNIFICANT CHANGE UP
HCOV HKU1 RNA SPEC QL NAA+PROBE: SIGNIFICANT CHANGE UP
HCOV NL63 RNA SPEC QL NAA+PROBE: SIGNIFICANT CHANGE UP
HCOV OC43 RNA SPEC QL NAA+PROBE: SIGNIFICANT CHANGE UP
HCT VFR BLD CALC: 41.9 % — SIGNIFICANT CHANGE UP (ref 39–50)
HDLC SERPL-MCNC: 50 MG/DL — SIGNIFICANT CHANGE UP
HGB BLD-MCNC: 14.1 G/DL — SIGNIFICANT CHANGE UP (ref 13–17)
HMPV RNA SPEC QL NAA+PROBE: SIGNIFICANT CHANGE UP
HPIV1 RNA SPEC QL NAA+PROBE: SIGNIFICANT CHANGE UP
HPIV2 RNA SPEC QL NAA+PROBE: SIGNIFICANT CHANGE UP
HPIV3 RNA SPEC QL NAA+PROBE: SIGNIFICANT CHANGE UP
HPIV4 RNA SPEC QL NAA+PROBE: SIGNIFICANT CHANGE UP
INR BLD: 1.16 RATIO — SIGNIFICANT CHANGE UP (ref 0.88–1.16)
LIPID PNL WITH DIRECT LDL SERPL: 85 MG/DL — SIGNIFICANT CHANGE UP
M PNEUMO DNA SPEC QL NAA+PROBE: SIGNIFICANT CHANGE UP
MCHC RBC-ENTMCNC: 29.6 PG — SIGNIFICANT CHANGE UP (ref 27–34)
MCHC RBC-ENTMCNC: 33.7 GM/DL — SIGNIFICANT CHANGE UP (ref 32–36)
MCV RBC AUTO: 88 FL — SIGNIFICANT CHANGE UP (ref 80–100)
NON HDL CHOLESTEROL: 104 MG/DL — SIGNIFICANT CHANGE UP
NRBC # BLD: 0 /100 WBCS — SIGNIFICANT CHANGE UP (ref 0–0)
NRBC # FLD: 0 K/UL — SIGNIFICANT CHANGE UP (ref 0–0)
PLATELET # BLD AUTO: 215 K/UL — SIGNIFICANT CHANGE UP (ref 150–400)
POTASSIUM SERPL-MCNC: 4 MMOL/L — SIGNIFICANT CHANGE UP (ref 3.5–5.3)
POTASSIUM SERPL-SCNC: 4 MMOL/L — SIGNIFICANT CHANGE UP (ref 3.5–5.3)
PROT SERPL-MCNC: 7 G/DL — SIGNIFICANT CHANGE UP (ref 6–8.3)
PROTHROM AB SERPL-ACNC: 13.5 SEC — HIGH (ref 10.5–13.4)
RAPID RVP RESULT: SIGNIFICANT CHANGE UP
RBC # BLD: 4.76 M/UL — SIGNIFICANT CHANGE UP (ref 4.2–5.8)
RBC # FLD: 12.7 % — SIGNIFICANT CHANGE UP (ref 10.3–14.5)
RSV RNA SPEC QL NAA+PROBE: SIGNIFICANT CHANGE UP
RV+EV RNA SPEC QL NAA+PROBE: SIGNIFICANT CHANGE UP
SARS-COV-2 RNA SPEC QL NAA+PROBE: SIGNIFICANT CHANGE UP
SODIUM SERPL-SCNC: 142 MMOL/L — SIGNIFICANT CHANGE UP (ref 135–145)
TRIGL SERPL-MCNC: 97 MG/DL — SIGNIFICANT CHANGE UP
TSH SERPL-MCNC: 1.3 UIU/ML — SIGNIFICANT CHANGE UP (ref 0.27–4.2)
WBC # BLD: 7.96 K/UL — SIGNIFICANT CHANGE UP (ref 3.8–10.5)
WBC # FLD AUTO: 7.96 K/UL — SIGNIFICANT CHANGE UP (ref 3.8–10.5)

## 2022-09-26 PROCEDURE — 93010 ELECTROCARDIOGRAM REPORT: CPT

## 2022-09-26 PROCEDURE — 99222 1ST HOSP IP/OBS MODERATE 55: CPT | Mod: GC

## 2022-09-26 RX ORDER — AMLODIPINE BESYLATE 2.5 MG/1
5 TABLET ORAL DAILY
Refills: 0 | Status: DISCONTINUED | OUTPATIENT
Start: 2022-09-26 | End: 2022-09-30

## 2022-09-26 RX ORDER — INFLUENZA VIRUS VACCINE 15; 15; 15; 15 UG/.5ML; UG/.5ML; UG/.5ML; UG/.5ML
0.7 SUSPENSION INTRAMUSCULAR ONCE
Refills: 0 | Status: COMPLETED | OUTPATIENT
Start: 2022-09-26 | End: 2022-09-26

## 2022-09-26 RX ORDER — ATORVASTATIN CALCIUM 80 MG/1
10 TABLET, FILM COATED ORAL AT BEDTIME
Refills: 0 | Status: DISCONTINUED | OUTPATIENT
Start: 2022-09-26 | End: 2022-10-07

## 2022-09-26 RX ORDER — LISINOPRIL 2.5 MG/1
20 TABLET ORAL DAILY
Refills: 0 | Status: DISCONTINUED | OUTPATIENT
Start: 2022-09-26 | End: 2022-10-07

## 2022-09-26 RX ORDER — MIRTAZAPINE 45 MG/1
30 TABLET, ORALLY DISINTEGRATING ORAL AT BEDTIME
Refills: 0 | Status: DISCONTINUED | OUTPATIENT
Start: 2022-09-26 | End: 2022-10-07

## 2022-09-26 RX ORDER — VENLAFAXINE HCL 75 MG
37.5 CAPSULE, EXT RELEASE 24 HR ORAL DAILY
Refills: 0 | Status: DISCONTINUED | OUTPATIENT
Start: 2022-09-26 | End: 2022-09-29

## 2022-09-26 RX ORDER — QUETIAPINE FUMARATE 200 MG/1
25 TABLET, FILM COATED ORAL THREE TIMES A DAY
Refills: 0 | Status: DISCONTINUED | OUTPATIENT
Start: 2022-09-26 | End: 2022-10-07

## 2022-09-26 RX ORDER — CLONAZEPAM 1 MG
0.25 TABLET ORAL THREE TIMES A DAY
Refills: 0 | Status: DISCONTINUED | OUTPATIENT
Start: 2022-09-26 | End: 2022-10-03

## 2022-09-26 RX ORDER — PHENYLEPHRINE-SHARK LIVER OIL-MINERAL OIL-PETROLATUM RECTAL OINTMENT
1 OINTMENT (GRAM) RECTAL THREE TIMES A DAY
Refills: 0 | Status: DISCONTINUED | OUTPATIENT
Start: 2022-09-26 | End: 2022-10-04

## 2022-09-26 RX ADMIN — Medication 0.25 MILLIGRAM(S): at 23:43

## 2022-09-26 RX ADMIN — MIRTAZAPINE 30 MILLIGRAM(S): 45 TABLET, ORALLY DISINTEGRATING ORAL at 21:04

## 2022-09-26 RX ADMIN — PHENYLEPHRINE-SHARK LIVER OIL-MINERAL OIL-PETROLATUM RECTAL OINTMENT 1 APPLICATION(S): at 21:04

## 2022-09-26 RX ADMIN — Medication 0.25 MILLIGRAM(S): at 12:39

## 2022-09-26 RX ADMIN — ATORVASTATIN CALCIUM 10 MILLIGRAM(S): 80 TABLET, FILM COATED ORAL at 21:03

## 2022-09-26 RX ADMIN — QUETIAPINE FUMARATE 25 MILLIGRAM(S): 200 TABLET, FILM COATED ORAL at 21:03

## 2022-09-26 RX ADMIN — Medication 37.5 MILLIGRAM(S): at 21:03

## 2022-09-26 RX ADMIN — LISINOPRIL 20 MILLIGRAM(S): 2.5 TABLET ORAL at 21:03

## 2022-09-26 NOTE — BH PATIENT PROFILE - BRAND OF COVID-19 VACCINATION
details… Pfizer dose 1 and 2 Alert & oriented; no sensory, motor or coordination deficits, normal reflexes

## 2022-09-26 NOTE — BH PATIENT PROFILE - HOME MEDICATIONS
lovastatin 20 mg oral tablet , 1 tab(s) orally once a day  amlodipine-benazepril 5 mg-20 mg oral capsule , 1 cap(s) orally once a day  melatonin 3 mg oral tablet , 1 tab(s) orally once a day (at bedtime), As needed, Sleep  clonazePAM 0.5 mg oral tablet , 1 tab(s) orally 2 times a day  aspirin 81 mg oral tablet, chewable , 1 tab(s) orally once a day  mirtazapine 7.5 mg oral tablet , 1 tab(s) orally once a day (at bedtime)  oxyCODONE 5 mg oral tablet , 1 tab(s) orally 3 times a day, As needed, Severe Pain (7 - 10)  acetaminophen 325 mg oral tablet , 2 tab(s) orally every 6 hours

## 2022-09-26 NOTE — BH INPATIENT PSYCHIATRY ASSESSMENT NOTE - NSICDXBHSECONDARYDX_PSY_ALL_CORE
Reactive depression   F32.9  Hypertension   I10  Mild hyperlipidemia   E78.5   Reactive depression   F32.9  Hypertension   I10  Mild hyperlipidemia   E78.5  External hemorrhoid   K64.4

## 2022-09-26 NOTE — BH INPATIENT PSYCHIATRY ASSESSMENT NOTE - RISK ASSESSMENT
Patient is anxious, with secondary depressive symptoms. Patient has poor judgment and can be harmful to self. Patient had Hx of previous suicidal attempt one year ago for similar reason.    Patient is anxious, with secondary depressive symptoms. Patient has poor judgment and can be harmful to self. Patient had Hx of previous suicidal attempt one year ago for similar reason. Protective factors include supportive family, future oriented, stable medical health.

## 2022-09-26 NOTE — BH INPATIENT PSYCHIATRY ASSESSMENT NOTE - ABUSE / TRAUMA HISTORY
[employer] [place] Report:     Pt due for Bone density post fracture in March. Spoke with patient, scheduled 8/11/2022 after PCP appt.    
Unable to assess

## 2022-09-26 NOTE — BH INPATIENT PSYCHIATRY ASSESSMENT NOTE - NSSUICPROTFACT_PSY_ALL_CORE
Responsibility to children, family, or others/Supportive social network of family or friends/Fear of death or the actual act of killing self/Alevism beliefs

## 2022-09-26 NOTE — BH INPATIENT PSYCHIATRY ASSESSMENT NOTE - NSBHMETABOLIC_PSY_ALL_CORE_FT
BMI:   HbA1c:   Glucose:   BP: --  Lipid Panel:  BMI: BMI (kg/m2): 23.6 (09-26-22 @ 12:32)  HbA1c:   Glucose:   BP: 178/90 (09-26-22 @ 12:32) (178/90 - 178/90)  Lipid Panel:  BMI: BMI (kg/m2): 23.6 (09-26-22 @ 12:32)  HbA1c:   Glucose:   BP: 178/90 (09-26-22 @ 12:32) (178/90 - 178/90)  Lipid Panel: Date/Time: 09-26-22 @ 17:02  Cholesterol, Serum: 154  Direct LDL: --  HDL Cholesterol, Serum: 50  Total Cholesterol/HDL Ration Measurement: --  Triglycerides, Serum: 97

## 2022-09-26 NOTE — BH INPATIENT PSYCHIATRY ASSESSMENT NOTE - NSBHASSESSSUMMFT_PSY_ALL_CORE
72 y/o  male, unemployed, currently domiciled with wife in [place], with PMHx of HTN, BPH, with Past psych Hx significant for a previous suicide attempt by cutting wrist in July 2021, 2 previous psych admissions at [facility] and [facility], admitted to inpatient geriatric unit after worsening anxiety prompting wife to make hotline call over the weekend that she could not take care of the patient anymore.    Patient was maintained on Remeron 30 mg qhs, Seroquel 25 mg TID, and Klonopin 0.25 q AM & q afternoon. Patient's mood and mental status was maintained well until 1 month ago when his  daughter decided to move out and patient felt more financial burden.    At Geriatric clinic, patient signed voluntary papers but only after much prompting from his wife. He kept pacing but eventually allowed COVID swab and agreed to sit in the waiting area with his wife.    Plan:  1. Legal status: Voluntary Admission to the Inpatient Geriatric Unit (9:13)  2. Inpatient therapeutic milieu  3.  Restart Psychotropic Home medications:  Remeron 30 mg qhs, Seroquel 25 mg TID   Increase Klonopin to 0.25 mg q AM & Afternoon to 0.25 mg TID PRN for anxiety   4. Non     72 y/o  male, unemployed, currently domiciled with wife in [place], with PMHx of HTN, BPH, with Past psych Hx significant for a previous suicide attempt by cutting wrist in July 2021, 2 previous psych admissions at [facility] and [facility], admitted to inpatient geriatric unit after worsening anxiety prompting wife to make hotline call over the weekend that she could not take care of the patient anymore.    Patient was maintained on Remeron 30 mg qhs, Seroquel 25 mg TID, and Klonopin 0.25 q AM & q afternoon. Patient's mood and mental status was maintained well until 1 month ago when his  daughter decided to move out and patient felt more financial burden.    At Geriatric clinic, patient signed voluntary papers but only after much prompting from his wife. He kept pacing but eventually allowed COVID swab and agreed to sit in the waiting area with his wife.    Plan:  1. Legal status: Voluntary Admission to the Inpatient Geriatric Unit (9:13)  2. Inpatient therapeutic milieu  3.  Restart Psychotropic Home medications:  Remeron 30 mg qhs, Seroquel 25 mg TID   Increase Klonopin to 0.25 mg q AM & Afternoon to 0.25 mg TID PRN for anxiety   4. Medical management:    HTN:      70 y/o  male, unemployed, currently domiciled with wife in [place], with PMHx of HTN, BPH, with Past psych Hx significant for a previous suicide attempt by cutting wrist in July 2021, 2 previous psych admissions at [facility] and [facility], admitted to inpatient geriatric unit after worsening anxiety prompting wife to make hotline call over the weekend that she could not take care of the patient anymore.    Patient was maintained on Remeron 30 mg qhs, Seroquel 25 mg TID, and Klonopin 0.25 q AM & q afternoon. Patient's mood and mental status was maintained well until 1 month ago when his  daughter decided to move out and patient felt more financial burden.    At Geriatric clinic, patient signed voluntary papers but only after much prompting from his wife. He kept pacing but eventually allowed COVID swab and agreed to sit in the waiting area with his wife.    Plan:  1. Legal status: Voluntary Admission to the Inpatient Geriatric Unit (9:13)  2. Inpatient therapeutic milieu  3.  Restart Psychotropic Home medications:  Remeron 30 mg qhs, Seroquel 25 mg TID   Increase Klonopin to 0.25 mg q AM & Afternoon to 0.25 mg TID PRN for anxiety   4. Medical management:    HTN: C/w Norvasc 5 mg qd. Substitute Benazapril 20 mg with Lisinopril 20 mg qd     HLD: Substitute Lovastatin 20 mg with Atorvastatin 10 mg qhs    Hemorrhoids: Preparation H : Rectal application 3 times / day.   5. Group and Individual therapy as needed      70 y/o  male, unemployed, currently domiciled with wife in [place], with PMHx of HTN, BPH, with Past psych Hx significant for a previous suicide attempt by cutting wrist in July 2021, 2 previous psych admissions at [facility] and [facility], admitted to inpatient geriatric unit after worsening anxiety prompting wife to make hotline call over the weekend that she could not take care of the patient anymore.    Patient was maintained on Remeron 30 mg qhs, Seroquel 25 mg TID, and Klonopin 0.25 q AM & q afternoon. Patient's mood and mental status was maintained well until 1 month ago when his  daughter decided to move out and patient felt more financial burden.   At Geriatric clinic, patient signed voluntary papers but only after much prompting from his wife. He kept pacing but eventually allowed COVID swab and agreed to sit in the waiting area with his wife.    Plan:  1. Legal status: Voluntary Admission to the Inpatient Geriatric Unit (9:13)  2. Inpatient therapeutic milieu  3.  Restart Psychotropic Home medications:  Remeron 30 mg qhs, Seroquel 25 mg TID   Increase Klonopin to 0.25 mg q AM & Afternoon to 0.25 mg TID PRN for anxiety   4. Medical management:    HTN: C/w Norvasc 5 mg qd. Substitute Benazapril 20 mg with Lisinopril 20 mg qd     HLD: Substitute Lovastatin 20 mg with Atorvastatin 10 mg qhs    Hemorrhoids: Preparation H : Rectal application 3 times / day.     Consult surgery, follow recommendations.   5. Group and Individual therapy as needed  72 y/o  male, unemployed, currently domiciled with wife in [place], with PMHx of HTN, BPH, with Past psych Hx significant for a previous suicide attempt by cutting wrist in July 2021, 2 previous psych admissions at [facility] and [facility], admitted to inpatient geriatric unit after worsening anxiety prompting wife to make hotline call over the weekend that she could not take care of the patient anymore.    Patient was maintained on Remeron 30 mg qhs, Seroquel 25 mg TID, and Klonopin 0.25 q AM & q afternoon. Patient's mood and mental status was maintained well until 1 month ago when his daughter decided to move out and patient felt more financial burden. 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     At Geriatric clinic, patient signed voluntary papers but only after much prompting from his wife.     Plan:  1. Legal status: Voluntary Admission to the Inpatient Geriatric Unit (9:13)  2. Inpatient therapeutic milieu  3.  Restart Psychotropic Home medications:  Remeron 30 mg qhs, Seroquel 25 mg TID   Increase Klonopin from 0.25 mg q AM & Afternoon to 0.25 mg TID PRN for anxiety   4. Medical management:    HTN: C/w Norvasc 5 mg qd. Substitute Benazapril 20 mg with Lisinopril 20 mg qd     HLD: Substitute Lovastatin 20 mg with Atorvastatin 10 mg qhs    Hemorrhoids: Preparation H : Rectal application 3 times / day.     Consult surgery, follow recommendations.   5. Group and Individual therapy as needed

## 2022-09-26 NOTE — BH INPATIENT PSYCHIATRY ASSESSMENT NOTE - MSE UNSTRUCTURED FT
Patient is a 73 YO  male looks younger than his stated age. On initial presentation when he first came in to the unit, patient is apprehensive wandering around during the whole time of the interview.   Patient is a 73 YO  male looks younger than his stated age. On initial presentation when he first came in to the unit, patient is anxious, apprehensive wandering around during the whole time of the interview.  Patient did not maintain constant eye to eye contact due to persistent wandering. Patient was in depressed mood with anxious distress, with observed psychomotor hyperactivity. Patient was cooperative and reliable historian. Patient denied any current Suicidal ideations either active or passive. Patient denied any perceptual abnormalities. Insight is intact with poor judgement. Impulse control was poor during the interview. However, impulse control was regained after interview.

## 2022-09-26 NOTE — BH INPATIENT PSYCHIATRY ASSESSMENT NOTE - PAST PSYCHOTROPIC MEDICATION
Remeron 15mg QHS, Abilify 5mg QD, Klonopin 0.5mg BID-PRN and melatonin 3mg QHS-PRN.  Remeron was titrated to 22.5mg QHS with good effect and tolerability.  Patient endorsed improvement in sleep and mood which led to reduction of his utilization of Klonopin PRN (did not use for last 2 weeks of PHP). Klonopin was reduced to 0.5mg QD-PRN.

## 2022-09-26 NOTE — BH INPATIENT PSYCHIATRY ASSESSMENT NOTE - HPI (INCLUDE ILLNESS QUALITY, SEVERITY, DURATION, TIMING, CONTEXT, MODIFYING FACTORS, ASSOCIATED SIGNS AND SYMPTOMS)
72 y/o  male,  for the second time, no children, non-caregiver, unemployed, currently domiciled with wife in [place], with PMHx of HTN, with Past psych Hx significant for a previous suicide attempt by cut wrist in July 2021, 2 previous psych admissions at [facility] and [facility], admitted to inpatient geriatric unit after increasing anxiety prompting wife to make hotline call over the weekend that she could not take care of the patient anymore.     At Geriatric clinic, patient signed voluntary papers but only after much prompting from his wife. He kept pacing but eventually allowed COVID swab and agreed to sit in the waiting area with his wife.    On Evaluation at Geriatric Psych Unit: Patient was markedly anxious pacing around the unit, talking loudly to the nursing staff, trying to reach his admission papers to rip them off as he stated. Patient was then interviewed by the attending psychiatrist and the resident physician. Patient was asked to sit down and relax. He sat down for just one second, but he could not sit till. He suddenly stood up and kept wandering in the room where the interview was conducted. Patient was really apprehensive about his finances especially after he became unemployed and his daughter decided to move out, and his thoughts that she will no longer be financially participating in payment for the newly acquired apartment. Patient explained that they moved to a new apartment and then he got retired from his job. He is not able to work anymore and not participating in payment for the apartment.     Past Psych Hx:  (1 day admission in [facility] just before the Suicide attempt) & Psych admission in [facility] after attempting suicide.  70 y/o  male,  for the second time, no children, non-caregiver, unemployed, currently domiciled with wife in [place], with PMHx of HTN, BPH, with Past psych Hx significant for a previous suicide attempt by cutting wrist in July 2021, 2 previous psych admissions at [facility] and [facility], admitted to inpatient geriatric unit after worsening anxiety prompting wife to make hotline call over the weekend that she could not take care of the patient anymore. At Geriatric clinic, patient signed voluntary papers but only after much prompting from his wife. He kept pacing but eventually allowed COVID swab and agreed to sit in the waiting area with his wife.    On Evaluation at Geriatric Psych Unit: Patient was markedly anxious pacing around the unit, talking loudly to the nursing staff, trying to reach his admission papers to rip them off as he stated. Patient was then interviewed by the attending psychiatrist and the resident physician. Patient was asked to sit down and relax. He sat down for just one second, but he could not sit till. He suddenly stood up and kept wandering in the room where the interview was conducted. Patient was really apprehensive about his finances especially after he became unemployed and his daughter decided to move out, and his thoughts that she will no longer be financially participating in payment for the newly acquired apartment. Patient explained that they moved to a new apartment and then he got retired from his job. He is not able to work anymore and not participating in payment for the apartment. Patient explained his wandering while having interview that he has Claustrophobia and he feels that the place is relatively small, and evokes this phobia. Patient endorsed having panic attacks, but when screened for symptoms of panic including palpitation or perioral numbness or feeling lightheaded, he dined all of these symptoms. Patient expressed depressed mood comorbid with anxiety. he described his sleep as ok except for the last 2 weeks when he started to wake up earlier around 4 AM and not able to go back to sleep. He attributed worsening of mood and anxiety after his daughter moved out 1 months ago. But got markedly worse 2 weeks ago.     Past Psych Hx:  HANK   1 day admission in [facility] just before the Suicide attempt (one week before suicide attempt)    Psych admission in [facility] after attempting suicide.     Family Hx: Mother was diagnosed with OCD    72 y/o  male,  for the second time, no children, non-caregiver, unemployed, currently domiciled with wife in [place], with PMHx of HTN, BPH, with Past psych Hx significant for a previous suicide attempt by cutting wrist in July 2021, 2 previous psych admissions at [facility] and [facility], admitted to inpatient geriatric unit after worsening anxiety prompting wife to make hotline call over the weekend that she could not take care of the patient anymore. At Geriatric clinic, patient signed voluntary papers but only after much prompting from his wife. He kept pacing but eventually allowed COVID swab and agreed to sit in the waiting area with his wife.    On Evaluation at Geriatric Psych Unit: Patient was markedly anxious pacing around the unit, talking loudly to the nursing staff, trying to reach his admission papers to rip them off as he stated. Patient was then interviewed by the attending psychiatrist and the resident physician. Patient was asked to sit down and relax. He sat down for just one second, but he could not sit till. He suddenly stood up and kept wandering in the room where the interview was conducted. Patient was really apprehensive about his finances especially after he became unemployed and his daughter decided to move out, and his thoughts that she will no longer be financially participating in payment for the newly acquired apartment. Patient explained that they moved to a new apartment and then he got retired from his job. He is not able to work anymore and not participating in payment for the apartment. Patient explained his wandering while having interview that he has Claustrophobia and he feels that the place is relatively small, and evokes this phobia. Patient endorsed having panic attacks, but when screened for symptoms of panic including palpitation or perioral numbness or feeling lightheaded, he dined all of these symptoms. Patient expressed depressed mood comorbid with anxiety. he described his sleep as ok except for the last 2 weeks when he started to wake up earlier around 4 AM and not able to go back to sleep. He attributed worsening of mood and anxiety after his daughter moved out 1 months ago. But got markedly worse 2 weeks ago.    Past Psych Hx:  HANK   1 day admission in [facility] just before the Suicide attempt (one week before suicide attempt)    Psych admission in [facility] after attempting suicide.     Family Hx: Mother was diagnosed with OCD. She was diagnosed with glaucoma as well.    72 y/o  male,  for the second time, no children, non-caregiver, unemployed, currently domiciled with wife in [place], with PMHx of HTN, BPH, with Past psych Hx significant for a previous suicide attempt by cutting wrist in July 2021, 2 previous psych admissions at [facility] and [facility], admitted to inpatient geriatric unit after worsening anxiety prompting wife to make hotline call over the weekend that she could not take care of the patient anymore. At Geriatric clinic, patient signed voluntary papers but only after much prompting from his wife. He kept pacing but eventually allowed COVID swab and agreed to sit in the waiting area with his wife.    On Evaluation at Geriatric Psych Unit: Patient was markedly anxious pacing around the unit, talking loudly to the nursing staff, trying to reach his admission papers to rip them off as he stated. Patient was then interviewed by the attending psychiatrist and the resident physician. Patient was asked to sit down and relax. He sat down for just one second, but he could not sit till. He suddenly stood up and kept wandering in the room where the interview was conducted. Patient was really apprehensive about his finances especially after he became unemployed and his daughter decided to move out, and his thoughts that she will no longer be financially participating in payment for the newly acquired apartment. Patient explained that they moved to a new apartment and then he got retired from his job. He is not able to work anymore and not participating in payment for the apartment. Patient explained his wandering while having interview that he has Claustrophobia and he feels that the place is relatively small, and evokes this phobia. Patient endorsed having panic attacks, but when screened for symptoms of panic including palpitation or perioral numbness or feeling lightheaded, he dined all of these symptoms. Patient expressed depressed mood comorbid with anxiety. he described his sleep as ok except for the last 2 weeks when he started to wake up earlier around 4 AM and not able to go back to sleep. He attributed worsening of mood and anxiety after his daughter moved out 1 months ago. But got markedly worse 2 weeks ago.    Past Psych Hx:  MDD with previous suicidal attempt in July 2021  1 day admission in [facility] just before the Suicide attempt (one week before suicide attempt)    Psych admission in [facility] after attempting suicide.     Family Hx: Mother was diagnosed with OCD. She was diagnosed with glaucoma as well.    70 y/o  male,  for the second time, no children, non-caregiver, unemployed, currently domiciled with wife in [place], with PMHx of HTN, BPH, with Past psych Hx significant for a previous suicide attempt by cutting wrist in July 2021, 2 previous psych admissions at [facility] and [facility], admitted to inpatient geriatric unit after worsening anxiety prompting wife to make hotline call over the weekend that she could not take care of the patient anymore. At Geriatric clinic, patient signed voluntary papers but only after much prompting from his wife. He kept pacing but eventually allowed COVID swab and agreed to sit in the waiting area with his wife.    On Evaluation at Geriatric Psych Unit: Patient was markedly anxious pacing around the unit, talking loudly to the nursing staff, trying to reach his admission papers to rip them off as he stated. Patient was then interviewed by the attending psychiatrist and the resident physician. Patient was asked to sit down and relax. He sat down for just one second, but he could not sit till. He suddenly stood up and kept wandering in the room where the interview was conducted. Patient was really apprehensive about his finances especially after he became unemployed and his daughter decided to move out, and his thoughts that she will no longer be financially participating in payment for the newly acquired apartment. Patient explained that they moved to a new apartment and then he got retired from his job. He is not able to work anymore and not participating in payment for the apartment. Patient explained his wandering while having interview that he has Claustrophobia and he feels that the place is relatively small, and evokes this phobia. Patient endorsed having panic attacks, but when screened for symptoms of panic including palpitation or perioral numbness or feeling lightheaded, he dined all of these symptoms. Patient expressed depressed mood comorbid with anxiety. he described his sleep as ok except for the last 2 weeks when he started to wake up earlier around 4 AM and not able to go back to sleep. He attributed worsening of mood and anxiety after his daughter moved out 1 months ago. But got markedly worse 2 weeks ago.    Past Psych Hx:  MDD with previous suicidal attempt in July 2021  1 day admission in [facility] just before the Suicide attempt (one week before suicide attempt)    Psych admission in [facility] after attempting suicide.     Family Hx: Mother was diagnosed with OCD. She was diagnosed with glaucoma as well.     Collateral Hx from Wife (Ms. Rondon.):  Patient’s spouse states that in the past 2 weeks patient has been more anxious at home, pacing around at home early in the morning from 3 am onwards. She states that a few weeks ago, patient had an appointment with his psychiatrist Dr. Chavez, who was increasing one of his medications each week. Dr. Chavez advised that if his medications were not effective, then they might need to consider ECT as a treatment option. Ms. Rondon does not recall which medication was being increased. Ms. Rondon states that patient became very anxious about the procedure and elicited suicidal ideation that that if the medications were no longer working that he would hurt himself. Ms. Rondon states she was prompted to bring him to the hospital after his statement. She states that in the past few weeks, patient increasingly complained of being lonely and isolated. She states that he has become very picky with his food, but eats the same quantity of food that is usual for him. Regarding medication compliance, Ms. Rondon states that patient is compliant with medications and takes them at scheduled times during the day. She also states that he sleeps regularly at the same time.   Ms. Rondon states that in the past, the patient’s anxiety appeared ever since he took the Pfizer COVID vaccine. 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 After which, there was significant financial stress and his wife also became unemployed at the time. She states that patient became withdrawn and he did not want his family or friends to find out about his unemployment because he was ashamed. She also states that due to the pandemic, the patient was more isolated and would complain of being lonely often. Ms. Rondon states that in July 2021 patient was hospitalized after an incident of self harm. She states that after he was discharged, he was doing well at home, they would go on frequent trips and he “seemed like himself” again. She states that at his baseline, he was confident, outgoing, smart and funny. Ms. 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 She states that around February 2022, patient was diagnosed with cataracts and he became anxious that he would lose his vision because his late mother had glaucoma. Ms. Rondon states that after he knew he had cataracts, he was unable to keep his job as a  due to his anxiety. However, she states that he has no problems with his vision. She states that his anxiety has returned since then and became increasing after his recent appointment with his psychiatrist, who suggested a possibility of ECT if the medications were not effective.

## 2022-09-26 NOTE — BH INPATIENT PSYCHIATRY ASSESSMENT NOTE - SUICIDALITY
Date of last visit:  2/1/2021  Date of next visit:  Visit date not found    Requested Prescriptions     Pending Prescriptions Disp Refills    rosuvastatin (CRESTOR) 10 MG tablet [Pharmacy Med Name: ROSUVASTATIN CALCIUM 10 MG TAB] 90 tablet 1     Sig: take 1 tablet by mouth once daily No

## 2022-09-26 NOTE — BH INPATIENT PSYCHIATRY ASSESSMENT NOTE - NSBHCHARTREVIEWVS_PSY_A_CORE FT
Vital Signs Last 24 Hrs  T(C): --  T(F): --  HR: --  BP: --  BP(mean): --  RR: --  SpO2: --     Vital Signs Last 24 Hrs  T(C): 36.4 (09-26-22 @ 12:32), Max: 36.4 (09-26-22 @ 12:32)  T(F): 97.5 (09-26-22 @ 12:32), Max: 97.5 (09-26-22 @ 12:32)  HR: 110 (09-26-22 @ 12:32) (110 - 110)  BP: 178/90 (09-26-22 @ 12:32) (178/90 - 178/90)  BP(mean): --  RR: --  SpO2: --     Vital Signs Last 24 Hrs  T(C): 36.3 (09-26-22 @ 15:45), Max: 36.4 (09-26-22 @ 12:32)  T(F): 97.3 (09-26-22 @ 15:45), Max: 97.5 (09-26-22 @ 12:32)  HR: 110 (09-26-22 @ 12:32) (110 - 110)  BP: 178/90 (09-26-22 @ 12:32) (178/90 - 178/90)  BP(mean): --  RR: --  SpO2: --     Vital Signs Last 24 Hrs  T(C): 36.4 (09-27-22 @ 06:47), Max: 36.4 (09-26-22 @ 12:32)  T(F): 97.5 (09-27-22 @ 06:47), Max: 97.5 (09-26-22 @ 12:32)  HR: 110 (09-26-22 @ 12:32) (110 - 110)  BP: 178/90 (09-26-22 @ 12:32) (178/90 - 178/90)  BP(mean): --  RR: 20 (09-27-22 @ 06:47) (20 - 20)  SpO2: 97% (09-27-22 @ 06:47) (97% - 97%)    Orthostatic VS  09-27-22 @ 06:47  Lying BP: --/-- HR: --  Sitting BP: 152/85 HR: 89  Standing BP: 163/84 HR: 89  Site: --  Mode: --  Orthostatic VS  09-26-22 @ 20:05  Lying BP: --/-- HR: --  Sitting BP: 147/71 HR: 83  Standing BP: 148/79 HR: 88  Site: --  Mode: --

## 2022-09-26 NOTE — BH INPATIENT PSYCHIATRY ASSESSMENT NOTE - NSBHATTESTCOMMENTATTENDFT_PSY_A_CORE
Patient presenting as extremely anxious and unable to care for self or tend to day-to-day activities. Patient has been resistant to med changes as per his outpatient psychiatrist but was more amenable to them when discussed during initial interaction on admission. Plan is to start venlafaxine 37.5mg tomorrow in addition to existing meds. Rest of the history, exam, assessment, and plan as documented in Dr. Sinclair's note.

## 2022-09-26 NOTE — BH INPATIENT PSYCHIATRY ASSESSMENT NOTE - CURRENT MEDICATION
MEDICATIONS  (STANDING):    MEDICATIONS  (PRN):  clonazePAM  Tablet 0.25 milliGRAM(s) Oral three times a day PRN Anxiety   MEDICATIONS  (STANDING):  amLODIPine   Tablet 5 milliGRAM(s) Oral daily  atorvastatin 10 milliGRAM(s) Oral at bedtime  influenza  Vaccine (HIGH DOSE) 0.7 milliLiter(s) IntraMuscular once  lisinopril 20 milliGRAM(s) Oral daily  mirtazapine 30 milliGRAM(s) Oral at bedtime    MEDICATIONS  (PRN):  clonazePAM  Tablet 0.25 milliGRAM(s) Oral three times a day PRN Anxiety   MEDICATIONS  (STANDING):  amLODIPine   Tablet 5 milliGRAM(s) Oral daily  atorvastatin 10 milliGRAM(s) Oral at bedtime  hemorrhoidal Ointment 1 Application(s) Rectal three times a day  influenza  Vaccine (HIGH DOSE) 0.7 milliLiter(s) IntraMuscular once  lisinopril 20 milliGRAM(s) Oral daily  mirtazapine 30 milliGRAM(s) Oral at bedtime    MEDICATIONS  (PRN):  clonazePAM  Tablet 0.25 milliGRAM(s) Oral three times a day PRN Anxiety   MEDICATIONS  (STANDING):  amLODIPine   Tablet 5 milliGRAM(s) Oral daily  atorvastatin 10 milliGRAM(s) Oral at bedtime  hemorrhoidal Ointment 1 Application(s) Rectal three times a day  influenza  Vaccine (HIGH DOSE) 0.7 milliLiter(s) IntraMuscular once  lisinopril 20 milliGRAM(s) Oral daily  mirtazapine 30 milliGRAM(s) Oral at bedtime  venlafaxine XR 37.5 milliGRAM(s) Oral daily    MEDICATIONS  (PRN):  clonazePAM  Tablet 0.25 milliGRAM(s) Oral three times a day PRN Anxiety   MEDICATIONS  (STANDING):  amLODIPine   Tablet 5 milliGRAM(s) Oral daily  atorvastatin 10 milliGRAM(s) Oral at bedtime  hemorrhoidal Ointment 1 Application(s) Rectal three times a day  influenza  Vaccine (HIGH DOSE) 0.7 milliLiter(s) IntraMuscular once  lisinopril 20 milliGRAM(s) Oral daily  mirtazapine 30 milliGRAM(s) Oral at bedtime  QUEtiapine 25 milliGRAM(s) Oral three times a day  venlafaxine XR 37.5 milliGRAM(s) Oral daily    MEDICATIONS  (PRN):  clonazePAM  Tablet 0.25 milliGRAM(s) Oral three times a day PRN Anxiety   MEDICATIONS  (STANDING):  amLODIPine   Tablet 5 milliGRAM(s) Oral daily  atorvastatin 10 milliGRAM(s) Oral at bedtime  hemorrhoidal Ointment 1 Application(s) Rectal three times a day  lisinopril 20 milliGRAM(s) Oral daily  mirtazapine 30 milliGRAM(s) Oral at bedtime  QUEtiapine 25 milliGRAM(s) Oral three times a day  venlafaxine XR 37.5 milliGRAM(s) Oral daily    MEDICATIONS  (PRN):  clonazePAM  Tablet 0.25 milliGRAM(s) Oral three times a day PRN Anxiety

## 2022-09-27 PROCEDURE — 99232 SBSQ HOSP IP/OBS MODERATE 35: CPT | Mod: GC

## 2022-09-27 RX ORDER — SENNA PLUS 8.6 MG/1
2 TABLET ORAL AT BEDTIME
Refills: 0 | Status: DISCONTINUED | OUTPATIENT
Start: 2022-09-27 | End: 2022-10-07

## 2022-09-27 RX ADMIN — Medication 0.25 MILLIGRAM(S): at 18:07

## 2022-09-27 RX ADMIN — QUETIAPINE FUMARATE 25 MILLIGRAM(S): 200 TABLET, FILM COATED ORAL at 13:27

## 2022-09-27 RX ADMIN — PHENYLEPHRINE-SHARK LIVER OIL-MINERAL OIL-PETROLATUM RECTAL OINTMENT 1 APPLICATION(S): at 13:27

## 2022-09-27 RX ADMIN — AMLODIPINE BESYLATE 5 MILLIGRAM(S): 2.5 TABLET ORAL at 08:26

## 2022-09-27 RX ADMIN — Medication 0.25 MILLIGRAM(S): at 08:25

## 2022-09-27 RX ADMIN — PHENYLEPHRINE-SHARK LIVER OIL-MINERAL OIL-PETROLATUM RECTAL OINTMENT 1 APPLICATION(S): at 20:58

## 2022-09-27 RX ADMIN — QUETIAPINE FUMARATE 25 MILLIGRAM(S): 200 TABLET, FILM COATED ORAL at 08:26

## 2022-09-27 RX ADMIN — SENNA PLUS 2 TABLET(S): 8.6 TABLET ORAL at 20:55

## 2022-09-27 RX ADMIN — QUETIAPINE FUMARATE 25 MILLIGRAM(S): 200 TABLET, FILM COATED ORAL at 20:54

## 2022-09-27 RX ADMIN — ATORVASTATIN CALCIUM 10 MILLIGRAM(S): 80 TABLET, FILM COATED ORAL at 20:54

## 2022-09-27 RX ADMIN — MIRTAZAPINE 30 MILLIGRAM(S): 45 TABLET, ORALLY DISINTEGRATING ORAL at 20:54

## 2022-09-27 RX ADMIN — Medication 37.5 MILLIGRAM(S): at 08:26

## 2022-09-27 RX ADMIN — PHENYLEPHRINE-SHARK LIVER OIL-MINERAL OIL-PETROLATUM RECTAL OINTMENT 1 APPLICATION(S): at 11:14

## 2022-09-27 RX ADMIN — LISINOPRIL 20 MILLIGRAM(S): 2.5 TABLET ORAL at 08:25

## 2022-09-27 NOTE — BH INPATIENT PSYCHIATRY PROGRESS NOTE - MSE UNSTRUCTURED FT
Patient is a 71 YO  male looks younger than his stated age. On initial presentation when he first came in to the unit, patient is anxious, apprehensive wandering around during the whole time of the interview.  Patient did not maintain constant eye to eye contact due to persistent wandering. Patient was in depressed mood with anxious distress, with observed psychomotor hyperactivity. Patient was cooperative and reliable historian. Patient denied any current Suicidal ideations either active or passive. Patient denied any perceptual abnormalities. Insight is intact with poor judgement. Impulse control was poor during the interview. However, impulse control was regained after interview.

## 2022-09-27 NOTE — BH INPATIENT PSYCHIATRY PROGRESS NOTE - NSBHCHARTREVIEWVS_PSY_A_CORE FT
Vital Signs Last 24 Hrs  T(C): 36.4 (09-27-22 @ 06:47), Max: 36.4 (09-26-22 @ 20:05)  T(F): 97.5 (09-27-22 @ 06:47), Max: 97.5 (09-26-22 @ 20:05)  HR: --  BP: --  BP(mean): --  RR: 20 (09-27-22 @ 06:47) (20 - 20)  SpO2: 97% (09-27-22 @ 06:47) (97% - 97%)    Orthostatic VS  09-27-22 @ 06:47  Lying BP: --/-- HR: --  Sitting BP: 152/85 HR: 89  Standing BP: 163/84 HR: 89  Site: --  Mode: --  Orthostatic VS  09-26-22 @ 20:05  Lying BP: --/-- HR: --  Sitting BP: 147/71 HR: 83  Standing BP: 148/79 HR: 88  Site: --  Mode: --   Vital Signs Last 24 Hrs  T(C): 37.1 (09-27-22 @ 15:48), Max: 37.1 (09-27-22 @ 15:48)  T(F): 98.7 (09-27-22 @ 15:48), Max: 98.7 (09-27-22 @ 15:48)  RR: 20 (09-27-22 @ 06:47) (20 - 20)  SpO2: 97% (09-27-22 @ 06:47) (97% - 97%)    Orthostatic VS  09-27-22 @ 06:47  Sitting BP: 152/85 HR: 89  Standing BP: 163/84 HR: 89

## 2022-09-27 NOTE — BH INPATIENT PSYCHIATRY PROGRESS NOTE - CURRENT MEDICATION
MEDICATIONS  (STANDING):  amLODIPine   Tablet 5 milliGRAM(s) Oral daily  atorvastatin 10 milliGRAM(s) Oral at bedtime  hemorrhoidal Ointment 1 Application(s) Rectal three times a day  lisinopril 20 milliGRAM(s) Oral daily  mirtazapine 30 milliGRAM(s) Oral at bedtime  QUEtiapine 25 milliGRAM(s) Oral three times a day  venlafaxine XR 37.5 milliGRAM(s) Oral daily    MEDICATIONS  (PRN):  clonazePAM  Tablet 0.25 milliGRAM(s) Oral three times a day PRN Anxiety   MEDICATIONS  (STANDING):  amLODIPine   Tablet 5 milliGRAM(s) Oral daily  atorvastatin 10 milliGRAM(s) Oral at bedtime  hemorrhoidal Ointment 1 Application(s) Rectal three times a day  lisinopril 20 milliGRAM(s) Oral daily  mirtazapine 30 milliGRAM(s) Oral at bedtime  QUEtiapine 25 milliGRAM(s) Oral three times a day  senna 2 Tablet(s) Oral at bedtime  venlafaxine XR 37.5 milliGRAM(s) Oral daily    MEDICATIONS  (PRN):  clonazePAM  Tablet 0.25 milliGRAM(s) Oral three times a day PRN Anxiety

## 2022-09-27 NOTE — PSYCHIATRIC REHAB INITIAL EVALUATION - ABILITY TO HEAR (WITH HEARING AID OR HEARING APPLIANCE IF NORMALLY USED):
Date: 8/17/2021    Re:  Sherice Wilson        To Whom it May Concern:    This letter is to certify that Ms. Sherice Wilson has significant allergies and persistent asthma.  It would likely benefit Sherice if the carpet in her apartment was removed and replaced with a hard floor.     Thank you for your consideration of Ms. Wilson's health condition.    Regards,      Sabina Pack NP .   Adequate: hears normal conversation without difficulty

## 2022-09-27 NOTE — DIETITIAN INITIAL EVALUATION ADULT - PERTINENT MEDS FT
MEDICATIONS  (STANDING):  amLODIPine   Tablet 5 milliGRAM(s) Oral daily  atorvastatin 10 milliGRAM(s) Oral at bedtime  hemorrhoidal Ointment 1 Application(s) Rectal three times a day  lisinopril 20 milliGRAM(s) Oral daily  mirtazapine 30 milliGRAM(s) Oral at bedtime  QUEtiapine 25 milliGRAM(s) Oral three times a day  venlafaxine XR 37.5 milliGRAM(s) Oral daily

## 2022-09-27 NOTE — BH INPATIENT PSYCHIATRY PROGRESS NOTE - NSBHFUPINTERVALHXFT_PSY_A_CORE
Patient was seen and evaluated by the attending psychiatrist and the resident. His case was discussed with the team. Patient is still anxious. Very visible on the unit all the time wandering around. Almost all the time the patient can not sit still. Patient is concerned about his Hemorrhoids and asking for laxative in the morning as he was expecting a Bowel movement, but he did not. Patient was assured that he will be seen by the surgery and will recommend him appropriate plan. Patient reported initial insomnia for 2 hours then he slept in full and feeling refreshed. Pt did take Klonopin 0.25 mg PRN at night. He stated, it helped him sleep last night.  Patient was seen and evaluated by the attending psychiatrist and the resident. His case was discussed with the team. Patient is still anxious. Very visible on the unit all the time wandering around. Almost all the time the patient can not sit still. Patient is concerned about his Hemorrhoids and asking for laxative in the morning as he was expecting a Bowel movement, but he did not. Patient was assured that he will be seen by the surgery and will recommend him appropriate plan. Patient reported initial insomnia for 2 hours then he slept in full and feeling refreshed. Pt did take Klonopin 0.25 mg PRN at night. He stated, it helped him sleep last night.   Patient after having lunch, got a bowel movement. While passing stool, he had another episode of bleeding per rectum. He applied Preparation-H and it managed to stop the bleeding, which was fewer in amount than yesterday. Patient denied any pain, but admitted to strain a little bit.

## 2022-09-27 NOTE — BH SOCIAL WORK INITIAL PSYCHOSOCIAL EVALUATION - OTHER PAST PSYCHIATRIC HISTORY (INCLUDE DETAILS REGARDING ONSET, COURSE OF ILLNESS, INPATIENT/OUTPATIENT TREATMENT)
Pt has one previous S/A via cutting wrists in 2021 and was hospitalized at [facility].  Prior to that he was hospitalized at [facility] for depression and anxiety.  As per wife and pt, "all of this started with COVID." Pt attended the [facility] post discharge for [facility].

## 2022-09-27 NOTE — BH INPATIENT PSYCHIATRY PROGRESS NOTE - NSBHATTESTCOMMENTATTENDFT_PSY_A_CORE
Patient minimally less anxious and more receptive to med titration today. He started venlafaxine XR this morning and tolerated it well. Patient reports some concern about rectal bleed secondary to hemorrhoids, but was reassured that medical and surgical teams have been consulted and have no concerns at present. Rest of the history, exam, assessment, and plan as documented in Dr. Sinclair's note.

## 2022-09-27 NOTE — PSYCHIATRIC REHAB INITIAL EVALUATION - NSBHPRRECOMMEND_PSY_ALL_CORE
Writer met with patient to orient patient to the unit and introduce psychiatric rehabilitation staff and functions. Patient was receptive and cooperative to engage with writer for interview session. Patient was able to provide coherent narrative for his reason for admission and identified psychosocial stressors. Patient reported that his anxiety exacerbated since last year due to unemployment, change in housing situation, and his daughter moving out. Patient identified sudoku as his coping skill which he struggles to engage in nowadays due to shortened attention span. Patient expressed valid concerns with change in medication and ECT. Writer provided reassurance and validations for his worries. Patient denied SI, HI, AH, and VH. Patient presented with fair insight to his symptoms and demonstrated motivation to engage in treatment. Patient and writer were able to collaborate on establishing a psychiatric rehabilitation goal of identifying and utilizing coping skills to reduce anxiety. Psychiatric rehabilitation team will continue to engage with patient daily to provide support and encourage patient to attend groups for symptom management and coping skills development.

## 2022-09-27 NOTE — DIETITIAN INITIAL EVALUATION ADULT - OTHER INFO
Pt admitted to [facility] with Primary Dx. of HANK. Medical history pertinent for HTN, BPH. Saw Pt in day area; pacing while we talk. Reports good appetite/po intake. No GI distress but states has "bleeding hemorrhoids" Also, states eating well PTA. Follows a Regular diet. Denies chewing/swallowing difficulty. Discussed with Pt high fiber diet and fluid intake. Pt verbalized understanding. Will include fruits and salads in his menu. NKFA.

## 2022-09-27 NOTE — BH INPATIENT PSYCHIATRY PROGRESS NOTE - NSBHASSESSSUMMFT_PSY_ALL_CORE
72 y/o  male, unemployed, currently domiciled with wife in [place], with PMHx of HTN, BPH, with Past psych Hx significant for a previous suicide attempt by cutting wrist in July 2021, 2 previous psych admissions at [facility] and [facility], admitted to inpatient geriatric unit after worsening anxiety prompting wife to make hotline call over the weekend that she could not take care of the patient anymore.    Patient was maintained on Remeron 30 mg qhs, Seroquel 25 mg TID, and Klonopin 0.25 q AM & q afternoon. Patient's mood and mental status was maintained well until 1 month ago when his daughter decided to move out and patient felt more financial burden. 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     At Geriatric clinic, patient signed voluntary papers but only after much prompting from his wife.     Clinical update 9/27:  Patient is still anxious. Very visible on the unit all the time wandering around. Almost all the time the patient can not sit still. Patient is concerned about his Hemorrhoids and asking for laxative in the morning as he was expecting a Bowel movement, but he did not. Patient reported initial insomnia for 2 hours then he slept in full and feeling refreshed. He stated, it helped him sleep last night.     Plan:  1. Legal status: Voluntary Admission to the Inpatient Geriatric Unit (9:13)  2. Inpatient therapeutic milieu  3.  Restart Psychotropic Home medications:  Remeron 30 mg qhs, Seroquel 25 mg TID   Continue with Klonopin 0.25 mg TID PRN for anxiety   Start Venlafaxine 37.5 mg XR q AM for anxiety   4. Medical management:    HTN: C/w Norvasc 5 mg qd. Substitute Benazapril 20 mg with Lisinopril 20 mg qd     HLD: Substitute Lovastatin 20 mg with Atorvastatin 10 mg qhs    Hemorrhoids: Preparation H : Rectal application 3 times / day.     Consult surgery, follow recommendations.   5. Group and Individual therapy as needed 70 y/o  male, unemployed, currently domiciled with wife in [place], with PMHx of HTN, BPH, with Past psych Hx significant for a previous suicide attempt by cutting wrist in July 2021, 2 previous psych admissions at [facility] and [facility], admitted to inpatient geriatric unit after worsening anxiety prompting wife to make hotline call over the weekend that she could not take care of the patient anymore.    Patient was maintained on Remeron 30 mg qhs, Seroquel 25 mg TID, and Klonopin 0.25 q AM & q afternoon. Patient's mood and mental status was maintained well until 1 month ago when his daughter decided to move out and patient felt more financial burden. 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     At Geriatric clinic, patient signed voluntary papers but only after much prompting from his wife.     Clinical update 9/27:  Patient is still anxious. Very visible on the unit all the time wandering around. Almost all the time the patient can not sit still. Patient is concerned about his Hemorrhoids and asking for laxative in the morning as he was expecting a Bowel movement, but he did not. Patient reported initial insomnia for 2 hours then he slept in full and feeling refreshed. He stated, it helped him sleep last night. Patient reported having a negative colonoscopy 6-7 years ago.     Surgery was paged and talked to the surgery resident. She stated based on the Hx, and being vitally stable and HGB is 14, with no concerning anemia, patient does not need a surgical consult in the ED. He will require an outpatient Colonoscopy appointment after discharge.     Plan:  1. Legal status: Voluntary Admission to the Inpatient Geriatric Unit (9:13)  2. Inpatient therapeutic milieu  3.  Restart Psychotropic Home medications:  Remeron 30 mg qhs, Seroquel 25 mg TID   Continue with Klonopin 0.25 mg TID PRN for anxiety   Start Venlafaxine 37.5 mg XR q AM for anxiety   4. Medical management:    HTN: C/w Norvasc 5 mg qd. Substitute Benazapril 20 mg with Lisinopril 20 mg qd     HLD: Substitute Lovastatin 20 mg with Atorvastatin 10 mg qhs    Hemorrhoids: Preparation H : Rectal application 3 times / day.   5. Group and Individual therapy as needed 72 y/o  male, unemployed, currently domiciled with wife in [place], with PMHx of HTN, BPH, with Past psych Hx significant for a previous suicide attempt by cutting wrist in July 2021, 2 previous psych admissions at [facility] and [facility], admitted to inpatient geriatric unit after worsening anxiety prompting wife to make hotline call over the weekend that she could not take care of the patient anymore.    Patient was maintained on Remeron 30 mg qhs, Seroquel 25 mg TID, and Klonopin 0.25 q AM & q afternoon. Patient's mood and mental status was maintained well until 1 month ago when his daughter decided to move out and patient felt more financial burden. 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     At Geriatric clinic, patient signed voluntary papers but only after much prompting from his wife.     Clinical update 9/27:  Patient is still anxious. Very visible on the unit all the time wandering around. Almost all the time the patient can not sit still. Patient is concerned about his Hemorrhoids and asking for laxative in the morning as he was expecting a Bowel movement, but he did not. Patient reported initial insomnia for 2 hours then he slept in full and feeling refreshed. He stated, it helped him sleep last night. Patient reported having a negative colonoscopy 6-7 years ago.     Surgery was paged and talked to the surgery resident. She stated based on the Hx, and being vitally stable and HGB is 14, with no concerning anemia, patient does not need a surgical consult in the ED. He will require an outpatient Colonoscopy appointment after discharge.     Plan:  1. Legal status: Voluntary Admission to the Inpatient Geriatric Unit (9:13)  2. Inpatient therapeutic milieu  3.  Restart Psychotropic Home medications:  Remeron 30 mg qhs, Seroquel 25 mg TID   Continue with Klonopin 0.25 mg TID PRN for anxiety   Start Venlafaxine 37.5 mg XR q AM for anxiety   4. Medical management:    HTN: C/w Norvasc 5 mg qd. Substitute Benazapril 20 mg with Lisinopril 20 mg qd     HLD: Substitute Lovastatin 20 mg with Atorvastatin 10 mg qhs    Hemorrhoids: Preparation H : Rectal application 3 times / day.                         Start Senna: 2 tabs daily at bedtime   5. Group and Individual therapy as needed

## 2022-09-28 PROCEDURE — 99232 SBSQ HOSP IP/OBS MODERATE 35: CPT | Mod: GC

## 2022-09-28 RX ADMIN — Medication 0.25 MILLIGRAM(S): at 04:15

## 2022-09-28 RX ADMIN — PHENYLEPHRINE-SHARK LIVER OIL-MINERAL OIL-PETROLATUM RECTAL OINTMENT 1 APPLICATION(S): at 08:49

## 2022-09-28 RX ADMIN — QUETIAPINE FUMARATE 25 MILLIGRAM(S): 200 TABLET, FILM COATED ORAL at 12:29

## 2022-09-28 RX ADMIN — SENNA PLUS 2 TABLET(S): 8.6 TABLET ORAL at 21:02

## 2022-09-28 RX ADMIN — QUETIAPINE FUMARATE 25 MILLIGRAM(S): 200 TABLET, FILM COATED ORAL at 08:49

## 2022-09-28 RX ADMIN — LISINOPRIL 20 MILLIGRAM(S): 2.5 TABLET ORAL at 08:49

## 2022-09-28 RX ADMIN — ATORVASTATIN CALCIUM 10 MILLIGRAM(S): 80 TABLET, FILM COATED ORAL at 21:01

## 2022-09-28 RX ADMIN — MIRTAZAPINE 30 MILLIGRAM(S): 45 TABLET, ORALLY DISINTEGRATING ORAL at 21:02

## 2022-09-28 RX ADMIN — PHENYLEPHRINE-SHARK LIVER OIL-MINERAL OIL-PETROLATUM RECTAL OINTMENT 1 APPLICATION(S): at 21:02

## 2022-09-28 RX ADMIN — Medication 0.25 MILLIGRAM(S): at 13:42

## 2022-09-28 RX ADMIN — AMLODIPINE BESYLATE 5 MILLIGRAM(S): 2.5 TABLET ORAL at 08:48

## 2022-09-28 RX ADMIN — Medication 37.5 MILLIGRAM(S): at 08:48

## 2022-09-28 RX ADMIN — QUETIAPINE FUMARATE 25 MILLIGRAM(S): 200 TABLET, FILM COATED ORAL at 21:02

## 2022-09-28 NOTE — BH INPATIENT PSYCHIATRY PROGRESS NOTE - NSBHMETABOLIC_PSY_ALL_CORE_FT
BMI: BMI (kg/m2): 23.6 (09-26-22 @ 12:32)  HbA1c:   Glucose:   BP: 178/90 (09-26-22 @ 12:32) (178/90 - 178/90)  Lipid Panel: Date/Time: 09-26-22 @ 17:02  Cholesterol, Serum: 154  Direct LDL: --  HDL Cholesterol, Serum: 50  Total Cholesterol/HDL Ration Measurement: --  Triglycerides, Serum: 97   BMI: BMI (kg/m2): 23.6 (09-26-22 @ 12:32)  BP: 178/90 (09-26-22 @ 12:32) (178/90 - 178/90)  Lipid Panel: Date/Time: 09-26-22 @ 17:02  Cholesterol, Serum: 154  HDL Cholesterol, Serum: 50  Triglycerides, Serum: 97   BMI: BMI (kg/m2): 23.6 (09-26-22 @ 12:32)  Lipid Panel: Date/Time: 09-26-22 @ 17:02  Cholesterol, Serum: 154  HDL Cholesterol, Serum: 50  Triglycerides, Serum: 97

## 2022-09-28 NOTE — BH INPATIENT PSYCHIATRY PROGRESS NOTE - CURRENT MEDICATION
MEDICATIONS  (STANDING):  amLODIPine   Tablet 5 milliGRAM(s) Oral daily  atorvastatin 10 milliGRAM(s) Oral at bedtime  hemorrhoidal Ointment 1 Application(s) Rectal three times a day  lisinopril 20 milliGRAM(s) Oral daily  mirtazapine 30 milliGRAM(s) Oral at bedtime  QUEtiapine 25 milliGRAM(s) Oral three times a day  senna 2 Tablet(s) Oral at bedtime  venlafaxine XR 37.5 milliGRAM(s) Oral daily    MEDICATIONS  (PRN):  clonazePAM  Tablet 0.25 milliGRAM(s) Oral three times a day PRN Anxiety   MEDICATIONS  (STANDING):  amLODIPine   Tablet 5 milliGRAM(s) Oral daily  atorvastatin 10 milliGRAM(s) Oral at bedtime  hemorrhoidal Ointment 1 Application(s) Rectal three times a day  lisinopril 20 milliGRAM(s) Oral daily  mirtazapine 30 milliGRAM(s) Oral at bedtime  QUEtiapine 25 milliGRAM(s) Oral three times a day  senna 2 Tablet(s) Oral at bedtime    MEDICATIONS  (PRN):  clonazePAM  Tablet 0.25 milliGRAM(s) Oral three times a day PRN Anxiety

## 2022-09-28 NOTE — BH TREATMENT PLAN - NSTXDEPRESINTERRN_PSY_ALL_CORE
Encourage patient to participate in group activities, Monitor mood and behavior, encourage pt to verbalize his feelings and concerns with staff.
Encourage patient to participate in group activities, Monitor mood and behavior, encourage pt to verbalize his feelings and concerns with staff.

## 2022-09-28 NOTE — BH TREATMENT PLAN - NSTXHYPERINTERMD_PSY_ALL_CORE
Keep patient's baseline anxiety at the lowest level which would contribute to keep blood pressure controlled with Antihypertensive meds. Keep patient's baseline anxiety at the lowest level which would contribute to keep blood pressure controlled with antihypertensive meds.

## 2022-09-28 NOTE — BH INPATIENT PSYCHIATRY PROGRESS NOTE - NSBHCHARTREVIEWVS_PSY_A_CORE FT
Vital Signs Last 24 Hrs  T(C): 36.9 (09-28-22 @ 15:49), Max: 37 (09-28-22 @ 08:16)  T(F): 98.5 (09-28-22 @ 15:49), Max: 98.6 (09-28-22 @ 08:16)  HR: --  BP: --  BP(mean): --  RR: --  SpO2: 96% (09-28-22 @ 08:16) (96% - 96%)    Orthostatic VS  09-28-22 @ 08:16  Lying BP: --/-- HR: --  Sitting BP: 147/89 HR: 88  Standing BP: 150/90 HR: 89  Site: --  Mode: --  Orthostatic VS  09-27-22 @ 06:47  Lying BP: --/-- HR: --  Sitting BP: 152/85 HR: 89  Standing BP: 163/84 HR: 89  Site: --  Mode: --  Orthostatic VS  09-26-22 @ 20:05  Lying BP: --/-- HR: --  Sitting BP: 147/71 HR: 83  Standing BP: 148/79 HR: 88  Site: --  Mode: --   Vital Signs Last 24 Hrs  T(C): 36.9 (09-28-22 @ 15:49), Max: 37 (09-28-22 @ 08:16)  T(F): 98.5 (09-28-22 @ 15:49), Max: 98.6 (09-28-22 @ 08:16)  SpO2: 96% (09-28-22 @ 08:16) (96% - 96%)    Orthostatic VS  09-28-22 @ 08:16  Sitting BP: 147/89 HR: 88  Standing BP: 150/90 HR: 89 Vital Signs Last 24 Hrs  T(C): 36.7 (09-29-22 @ 05:15), Max: 36.9 (09-28-22 @ 15:49)  T(F): 98 (09-29-22 @ 05:15), Max: 98.5 (09-28-22 @ 15:49)  HR: --  BP: --  BP(mean): --  RR: 18 (09-29-22 @ 05:15) (18 - 18)  SpO2: --    Orthostatic VS  09-29-22 @ 05:15  Lying BP: --/-- HR: --  Sitting BP: 171/86 HR: 91  Standing BP: 169/87 HR: 89  Site: upper left arm  Mode: electronic  Orthostatic VS  09-28-22 @ 08:16  Lying BP: --/-- HR: --  Sitting BP: 147/89 HR: 88  Standing BP: 150/90 HR: 89  Site: --  Mode: --   Vital Signs Last 24 Hrs  T(C): 36.7 (09-29-22 @ 05:15), Max: 36.9 (09-28-22 @ 15:49)  T(F): 98 (09-29-22 @ 05:15), Max: 98.5 (09-28-22 @ 15:49)    Orthostatic VS  09-28-22 @ 08:16  Sitting BP: 147/89 HR: 88  Standing BP: 150/90 HR: 89

## 2022-09-28 NOTE — BH TREATMENT PLAN - ANXIETY/PANIC/FEAR NURSING INTERVENTIONS/RECOMMENDATIONS
Encouraged to practice deep breathing techniques.
Assess patient's mood and behavior, Offer fresh air break, minimize external stimuli, encourage patient to verbalize his feelings and concerns with staff, offer PRN medications for anxiety.

## 2022-09-28 NOTE — BH INPATIENT PSYCHIATRY PROGRESS NOTE - NSBHFUPINTERVALHXFT_PSY_A_CORE
Patient was seen and evaluated by the attending psychiatrist and the resident . Case was discussed with the team. Patient reported that he had a visit rom his wife yesterday and she is optimistic about his stay in the hospital. Patient is still anxious and apprehensive. While running the interview in the hallway, patient preferred to be standing and was wandering around the resident while he was conducting the interview while standing. Patient reported that he had a bowel movement this morning with any reported hematochezia. Patient did receive 2 Tabs of senna at night. Pt denied any loose stools.

## 2022-09-28 NOTE — BH INPATIENT PSYCHIATRY PROGRESS NOTE - NSBHASSESSSUMMFT_PSY_ALL_CORE
72 y/o  male, unemployed, currently domiciled with wife in [place], with PMHx of HTN, BPH, with Past psych Hx significant for a previous suicide attempt by cutting wrist in July 2021, 2 previous psych admissions at [facility] and [facility], admitted to inpatient geriatric unit after worsening anxiety prompting wife to make hotline call over the weekend that she could not take care of the patient anymore.    Patient was maintained on Remeron 30 mg qhs, Seroquel 25 mg TID, and Klonopin 0.25 q AM & q afternoon. Patient's mood and mental status was maintained well until 1 month ago when his daughter decided to move out and patient felt more financial burden. 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     At Geriatric clinic, patient signed voluntary papers but only after much prompting from his wife.     Clinical update 9/27:  Patient was seen and evaluated by the attending psychiatrist and the resident . Case was discussed with the team. Patient reported that he had a visit rom his wife yesterday and she is optimistic about his stay in the hospital. Patient is still anxious and apprehensive. While running the interview in the hallway, patient preferred to be standing and was wandering around the resident while he was conducting the interview while standing. Patient reported that he had a bowel movement this morning with any reported hematochezia. Patient did receive 2 Tabs of senna at night. Pt denied any loose stools.     Plan:  1. Legal status: Voluntary Admission to the Inpatient Geriatric Unit (9:13)  2. Inpatient therapeutic milieu  3.  Restart Psychotropic Home medications:  Remeron 30 mg qhs, Seroquel 25 mg TID   Continue with Klonopin 0.25 mg TID PRN for anxiety   Continue with Venlafaxine 37.5 mg XR q AM for anxiety   4. Medical management:    HTN: C/w Norvasc 5 mg qd. Substitute Benazapril 20 mg with Lisinopril 20 mg qd     HLD: Substitute Lovastatin 20 mg with Atorvastatin 10 mg qhs    Hemorrhoids: Continue with Preparation H : Rectal application 3 times / day.                          Continue with  Senna: 2 tabs daily at bedtime   5. Group and Individual therapy as needed 70 y/o  male, unemployed, currently domiciled with wife in [place], with PMHx of HTN, BPH, with Past psych Hx significant for a previous suicide attempt by cutting wrist in July 2021, 2 previous psych admissions at [facility] and [facility], admitted to inpatient geriatric unit after worsening anxiety prompting wife to make hotline call over the weekend that she could not take care of the patient anymore.    Patient was maintained on Remeron 30 mg qhs, Seroquel 25 mg TID, and Klonopin 0.25 q AM & q afternoon. Patient's mood and mental status was maintained well until 1 month ago when his daughter decided to move out and patient felt more financial burden. 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     At Geriatric clinic, patient signed voluntary papers but only after much prompting from his wife.     Clinical update 9/28:  Patient reported that he had a visit rom his wife yesterday and she is optimistic about his stay in the hospital. Patient is still anxious and apprehensive. While running the interview in the hallway, patient preferred to be standing and was wandering around the resident while he was conducting the interview while standing. Patient reported that he had a bowel movement this morning with any reported hematochezia. Patient did receive 2 Tabs of senna at night. Pt denied any loose stools.     Plan:  1. Legal status: Voluntary Admission to the Inpatient Geriatric Unit (9:13)  2. Inpatient therapeutic milieu  3. Continue with Psychotropic Home medications:  Remeron 30 mg qhs, Seroquel 25 mg TID   Continue with Klonopin 0.25 mg TID PRN for anxiety   For Anxiety: Continue with Venlafaxine 37.5 mg XR q AM for anxiety (Started on 9/27)  4. Medical management:    HTN: C/w Norvasc 5 mg qd. Substitute Benazapril 20 mg with Lisinopril 20 mg qd     HLD: Substitute Lovastatin 20 mg with Atorvastatin 10 mg qhs    Hemorrhoids: Continue with Preparation H : Rectal application 3 times / day.                          Continue with  Senna: 2 tabs daily at bedtime   5. Group and Individual therapy as needed

## 2022-09-28 NOTE — BH TREATMENT PLAN - NSTXANXINTERMD_PSY_ALL_CORE
Implemented All Fall with Harm Risk Interventions:  [place] to call system. Call bell, personal items and telephone within reach. Instruct patient to call for assistance. Room bathroom lighting operational. Non-slip footwear when patient is off stretcher. Physically safe environment: no spills, clutter or unnecessary equipment. Stretcher in lowest position, wheels locked, appropriate side rails in place. Provide visual cue, wrist band, yellow gown, etc. Monitor gait and stability. Monitor for mental status changes and reorient to person, place, and time. Review medications for side effects contributing to fall risk. Reinforce activity limits and safety measures with patient and family. Provide visual clues: red socks. Med management with [facility] Med titration with SNRI--started on venlafaxine and is tolerating it well so far. Will also continue quetiapine and PRN clonazepam

## 2022-09-28 NOTE — BH INPATIENT PSYCHIATRY PROGRESS NOTE - NSBHATTESTCOMMENTATTENDFT_PSY_A_CORE
Patient reports minimal improvement in anxiety and understands that most of his fears are irrational; is agreeable to starting individual therapy during his stay here. He is tolerating venlafaxine XR well so far. Plan is to increase dose to 75mg on 09/30. No medication change made today. Rest of the history, exam, assessment, and plan as documented in Dr. Sinclair's note.

## 2022-09-29 PROCEDURE — 99231 SBSQ HOSP IP/OBS SF/LOW 25: CPT | Mod: GC

## 2022-09-29 RX ORDER — VENLAFAXINE HCL 75 MG
75 CAPSULE, EXT RELEASE 24 HR ORAL DAILY
Refills: 0 | Status: DISCONTINUED | OUTPATIENT
Start: 2022-09-30 | End: 2022-10-03

## 2022-09-29 RX ADMIN — QUETIAPINE FUMARATE 25 MILLIGRAM(S): 200 TABLET, FILM COATED ORAL at 12:07

## 2022-09-29 RX ADMIN — Medication 0.25 MILLIGRAM(S): at 20:06

## 2022-09-29 RX ADMIN — Medication 0.25 MILLIGRAM(S): at 10:38

## 2022-09-29 RX ADMIN — QUETIAPINE FUMARATE 25 MILLIGRAM(S): 200 TABLET, FILM COATED ORAL at 21:07

## 2022-09-29 RX ADMIN — SENNA PLUS 2 TABLET(S): 8.6 TABLET ORAL at 21:06

## 2022-09-29 RX ADMIN — MIRTAZAPINE 30 MILLIGRAM(S): 45 TABLET, ORALLY DISINTEGRATING ORAL at 21:06

## 2022-09-29 RX ADMIN — PHENYLEPHRINE-SHARK LIVER OIL-MINERAL OIL-PETROLATUM RECTAL OINTMENT 1 APPLICATION(S): at 12:07

## 2022-09-29 RX ADMIN — LISINOPRIL 20 MILLIGRAM(S): 2.5 TABLET ORAL at 08:45

## 2022-09-29 RX ADMIN — QUETIAPINE FUMARATE 25 MILLIGRAM(S): 200 TABLET, FILM COATED ORAL at 08:45

## 2022-09-29 RX ADMIN — Medication 37.5 MILLIGRAM(S): at 08:45

## 2022-09-29 RX ADMIN — AMLODIPINE BESYLATE 5 MILLIGRAM(S): 2.5 TABLET ORAL at 08:45

## 2022-09-29 RX ADMIN — PHENYLEPHRINE-SHARK LIVER OIL-MINERAL OIL-PETROLATUM RECTAL OINTMENT 1 APPLICATION(S): at 08:45

## 2022-09-29 RX ADMIN — ATORVASTATIN CALCIUM 10 MILLIGRAM(S): 80 TABLET, FILM COATED ORAL at 21:06

## 2022-09-29 NOTE — BH INPATIENT PSYCHIATRY PROGRESS NOTE - NSBHATTESTCOMMENTATTENDFT_PSY_A_CORE
Patient less restless and anxious appearing today. Is relieved when informed that team discussed about discharging him towards the end of next week once his medications are optimized. Patient denies any thought of self-harm. Plan is to increase his venlafaxine XR to 75mg daily. Patient's wife Mia contacted and updated on progress and plan. Rest of the history, exam, assessment, and plan as documented in Dr. Sinclair's note.

## 2022-09-29 NOTE — BH INPATIENT PSYCHIATRY PROGRESS NOTE - NSBHFUPINTERVALHXFT_PSY_A_CORE
Patient was seen and evaluated by the attending psychiatrist and the resident . Case was discussed with the team. Today, was the first day he conducted most of the interview while sitting. At the end of the interview, he stood up and started wandering. He described his anxiety level as moderate. He explained that he feels anxious about how he can spend time here. He brought up how he needs a  to play chess with him as he used to.

## 2022-09-29 NOTE — BH INPATIENT PSYCHIATRY PROGRESS NOTE - NSBHCHARTREVIEWVS_PSY_A_CORE FT
Vital Signs Last 24 Hrs  T(C): 36.7 (09-29-22 @ 05:15), Max: 36.9 (09-28-22 @ 15:49)  T(F): 98 (09-29-22 @ 05:15), Max: 98.5 (09-28-22 @ 15:49)  HR: --  BP: --  BP(mean): --  RR: 18 (09-29-22 @ 05:15) (18 - 18)  SpO2: --    Orthostatic VS  09-29-22 @ 05:15  Lying BP: --/-- HR: --  Sitting BP: 171/86 HR: 91  Standing BP: 169/87 HR: 89  Site: upper left arm  Mode: electronic  Orthostatic VS  09-28-22 @ 08:16  Lying BP: --/-- HR: --  Sitting BP: 147/89 HR: 88  Standing BP: 150/90 HR: 89  Site: --  Mode: --   Vital Signs Last 24 Hrs  T(C): 36.7 (09-29-22 @ 05:15), Max: 36.9 (09-28-22 @ 15:49)  T(F): 98 (09-29-22 @ 05:15), Max: 98.5 (09-28-22 @ 15:49)  HR: --  BP: --  BP(mean): --  RR: 18 (09-29-22 @ 05:15) (18 - 18)  SpO2: --    Orthostatic VS  09-29-22 @ 09:30  Lying BP: --/-- HR: --  Sitting BP: 128/63 HR: 76  Standing BP: --/-- HR: --  Site: upper right arm  Mode: electronic  Orthostatic VS  09-29-22 @ 05:15  Lying BP: --/-- HR: --  Sitting BP: 171/86 HR: 91  Standing BP: 169/87 HR: 89  Site: upper left arm  Mode: electronic  Orthostatic VS  09-28-22 @ 08:16  Lying BP: --/-- HR: --  Sitting BP: 147/89 HR: 88  Standing BP: 150/90 HR: 89  Site: --  Mode: --   Vital Signs Last 24 Hrs  T(C): 36.7 (09-29-22 @ 05:15), Max: 36.9 (09-28-22 @ 15:49)  T(F): 98 (09-29-22 @ 05:15), Max: 98.5 (09-28-22 @ 15:49)  RR: 18 (09-29-22 @ 05:15) (18 - 18)    09-29-22 @ 09:30  Sitting BP: 128/63 HR: 76

## 2022-09-29 NOTE — BH INPATIENT PSYCHIATRY PROGRESS NOTE - NSBHASSESSSUMMFT_PSY_ALL_CORE
72 y/o  male, unemployed, currently domiciled with wife in [place], with PMHx of HTN, BPH, with Past psych Hx significant for a previous suicide attempt by cutting wrist in July 2021, 2 previous psych admissions at [facility] and [facility], admitted to inpatient geriatric unit after worsening anxiety prompting wife to make hotline call over the weekend that she could not take care of the patient anymore.    Patient was maintained on Remeron 30 mg qhs, Seroquel 25 mg TID, and Klonopin 0.25 q AM & q afternoon. Patient's mood and mental status was maintained well until 1 month ago when his daughter decided to move out and patient felt more financial burden. 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     At Geriatric clinic, patient signed voluntary papers but only after much prompting from his wife.     Clinical update 9/29:  Today, was the first day he conducted most of the interview while sitting. At the end of the interview, he stood up and started wandering. He described his anxiety level as moderate. He explained that he feels anxious about how he can spend time here. He brought up how he needs a  to play chess with him as he used to.     Plan:  1. Legal status: Voluntary Admission to the Inpatient Geriatric Unit (9:13)  2. Inpatient therapeutic milieu  3. Continue with Psychotropic Home medications:  Remeron 30 mg qhs, Seroquel 25 mg TID   Continue with Klonopin 0.25 mg TID PRN for anxiety   For Anxiety: Continue with Venlafaxine 37.5 mg XR q AM for anxiety (Started on 9/27)  Will increase dose of Effexor from 37.5 to 75 on (9/30)   4. Medical management:    HTN: C/w Norvasc 5 mg qd. Substitute Benazapril 20 mg with Lisinopril 20 mg qd      Will consider increase of Norvasc dose if patient persists to have initial high blood pressure in the morning before taking Anti-HTN medication    HLD: Continue with Atorvastatin 10 mg qhs    Hemorrhoids: Continue with Preparation H : Rectal application 3 times / day.

## 2022-09-29 NOTE — BH INPATIENT PSYCHIATRY PROGRESS NOTE - NSBHMETABOLIC_PSY_ALL_CORE_FT
BMI: BMI (kg/m2): 23.6 (09-26-22 @ 12:32)  HbA1c:   Glucose:   BP: 178/90 (09-26-22 @ 12:32) (178/90 - 178/90)  Lipid Panel: Date/Time: 09-26-22 @ 17:02  Cholesterol, Serum: 154  Direct LDL: --  HDL Cholesterol, Serum: 50  Total Cholesterol/HDL Ration Measurement: --  Triglycerides, Serum: 97   BMI: BMI (kg/m2): 23.6 (09-26-22 @ 12:32)  Lipid Panel: Date/Time: 09-26-22 @ 17:02  Cholesterol, Serum: 154  HDL Cholesterol, Serum: 50  Triglycerides, Serum: 97

## 2022-09-30 PROCEDURE — 99232 SBSQ HOSP IP/OBS MODERATE 35: CPT | Mod: GC

## 2022-09-30 RX ORDER — AMLODIPINE BESYLATE 2.5 MG/1
7.5 TABLET ORAL DAILY
Refills: 0 | Status: DISCONTINUED | OUTPATIENT
Start: 2022-10-01 | End: 2022-10-07

## 2022-09-30 RX ADMIN — MIRTAZAPINE 30 MILLIGRAM(S): 45 TABLET, ORALLY DISINTEGRATING ORAL at 20:54

## 2022-09-30 RX ADMIN — ATORVASTATIN CALCIUM 10 MILLIGRAM(S): 80 TABLET, FILM COATED ORAL at 20:53

## 2022-09-30 RX ADMIN — PHENYLEPHRINE-SHARK LIVER OIL-MINERAL OIL-PETROLATUM RECTAL OINTMENT 1 APPLICATION(S): at 08:19

## 2022-09-30 RX ADMIN — Medication 0.25 MILLIGRAM(S): at 09:42

## 2022-09-30 RX ADMIN — QUETIAPINE FUMARATE 25 MILLIGRAM(S): 200 TABLET, FILM COATED ORAL at 08:20

## 2022-09-30 RX ADMIN — QUETIAPINE FUMARATE 25 MILLIGRAM(S): 200 TABLET, FILM COATED ORAL at 12:19

## 2022-09-30 RX ADMIN — LISINOPRIL 20 MILLIGRAM(S): 2.5 TABLET ORAL at 08:20

## 2022-09-30 RX ADMIN — QUETIAPINE FUMARATE 25 MILLIGRAM(S): 200 TABLET, FILM COATED ORAL at 20:54

## 2022-09-30 RX ADMIN — AMLODIPINE BESYLATE 5 MILLIGRAM(S): 2.5 TABLET ORAL at 08:20

## 2022-09-30 RX ADMIN — PHENYLEPHRINE-SHARK LIVER OIL-MINERAL OIL-PETROLATUM RECTAL OINTMENT 1 APPLICATION(S): at 12:18

## 2022-09-30 RX ADMIN — Medication 0.25 MILLIGRAM(S): at 17:48

## 2022-09-30 RX ADMIN — SENNA PLUS 2 TABLET(S): 8.6 TABLET ORAL at 20:54

## 2022-09-30 RX ADMIN — Medication 75 MILLIGRAM(S): at 08:20

## 2022-09-30 NOTE — BH INPATIENT PSYCHIATRY PROGRESS NOTE - NSBHCHARTREVIEWVS_PSY_A_CORE FT
Vital Signs Last 24 Hrs  T(C): 36.7 (09-30-22 @ 08:14), Max: 37 (09-29-22 @ 15:41)  T(F): 98.1 (09-30-22 @ 08:14), Max: 98.6 (09-29-22 @ 15:41)  HR: --  BP: --  BP(mean): --  RR: 19 (09-30-22 @ 08:14) (19 - 19)  SpO2: 98% (09-30-22 @ 08:14) (98% - 98%)    Orthostatic VS  09-30-22 @ 08:14  Lying BP: --/-- HR: --  Sitting BP: 165/75 HR: 80  Standing BP: 152/76 HR: 86  Site: upper left arm  Mode: electronic  Orthostatic VS  09-29-22 @ 09:30  Lying BP: --/-- HR: --  Sitting BP: 128/63 HR: 76  Standing BP: --/-- HR: --  Site: upper right arm  Mode: electronic  Orthostatic VS  09-29-22 @ 05:15  Lying BP: --/-- HR: --  Sitting BP: 171/86 HR: 91  Standing BP: 169/87 HR: 89  Site: upper left arm  Mode: electronic   Vital Signs Last 24 Hrs  T(C): 36.7 (09-30-22 @ 08:14), Max: 37 (09-29-22 @ 15:41)  T(F): 98.1 (09-30-22 @ 08:14), Max: 98.6 (09-29-22 @ 15:41)  HR: --  BP: --  BP(mean): --  RR: 19 (09-30-22 @ 08:14) (19 - 19)  SpO2: 98% (09-30-22 @ 08:14) (98% - 98%)    Orthostatic VS  09-30-22 @ 11:00  Lying BP: --/-- HR: --  Sitting BP: 132/54 HR: 83  Standing BP: 108/56 HR: 90  Site: upper right arm  Mode: electronic  Orthostatic VS  09-30-22 @ 08:14  Lying BP: --/-- HR: --  Sitting BP: 165/75 HR: 80  Standing BP: 152/76 HR: 86  Site: upper left arm  Mode: electronic  Orthostatic VS  09-29-22 @ 09:30  Lying BP: --/-- HR: --  Sitting BP: 128/63 HR: 76  Standing BP: --/-- HR: --  Site: upper right arm  Mode: electronic  Orthostatic VS  09-29-22 @ 05:15  Lying BP: --/-- HR: --  Sitting BP: 171/86 HR: 91  Standing BP: 169/87 HR: 89  Site: upper left arm  Mode: electronic   Vital Signs Last 24 Hrs  T(C): 36.8 (09-30-22 @ 15:37), Max: 37 (09-29-22 @ 15:41)  T(F): 98.3 (09-30-22 @ 15:37), Max: 98.6 (09-29-22 @ 15:41)  RR: 19 (09-30-22 @ 08:14) (19 - 19)  SpO2: 98% (09-30-22 @ 08:14) (98% - 98%)    Orthostatic VS  09-30-22 @ 11:00  Sitting BP: 132/54 HR: 83  Standing BP: 108/56 HR: 90  Site: upper right arm  Mode: electronic   Vital Signs Last 24 Hrs  T(C): 36.8 (09-30-22 @ 15:37), Max: 36.8 (09-30-22 @ 15:37)  T(F): 98.3 (09-30-22 @ 15:37), Max: 98.3 (09-30-22 @ 15:37)  HR: --  BP: --  BP(mean): --  RR: 19 (09-30-22 @ 08:14) (19 - 19)  SpO2: 98% (09-30-22 @ 08:14) (98% - 98%)    Orthostatic VS  09-30-22 @ 11:00  Lying BP: --/-- HR: --  Sitting BP: 132/54 HR: 83  Standing BP: 108/56 HR: 90  Site: upper right arm  Mode: electronic  Orthostatic VS  09-30-22 @ 08:14  Lying BP: --/-- HR: --  Sitting BP: 165/75 HR: 80  Standing BP: 152/76 HR: 86  Site: upper left arm  Mode: electronic  Orthostatic VS  09-29-22 @ 09:30  Lying BP: --/-- HR: --  Sitting BP: 128/63 HR: 76  Standing BP: --/-- HR: --  Site: upper right arm  Mode: electronic  Orthostatic VS  09-29-22 @ 05:15  Lying BP: --/-- HR: --  Sitting BP: 171/86 HR: 91  Standing BP: 169/87 HR: 89  Site: upper left arm  Mode: electronic   Vital Signs Last 24 Hrs  T(C): 36.5 (10-04-22 @ 08:08), Max: 36.6 (10-03-22 @ 16:21)  T(F): 97.7 (10-04-22 @ 08:08), Max: 97.8 (10-03-22 @ 16:21)  HR: --  BP: --  BP(mean): --  RR: --  SpO2: --    Orthostatic VS  10-04-22 @ 08:08  Lying BP: --/-- HR: --  Sitting BP: 152/72 HR: 78  Standing BP: 139/69 HR: 82  Site: --  Mode: --  Orthostatic VS  10-03-22 @ 10:03  Lying BP: --/-- HR: --  Sitting BP: 155/72 HR: 82  Standing BP: 154/76 HR: 87  Site: --  Mode: --

## 2022-09-30 NOTE — BH INPATIENT PSYCHIATRY PROGRESS NOTE - NSBHFUPINTERVALHXFT_PSY_A_CORE
Patient was seen and evaluated by the attending psychiatrist and the resident . Case was discussed with the team. Patient's blood pressure recorded over the last 2 days is around 160-170 before taking Norvasc. Patient stated he is moderately anxious about staying here. He is worried how he can spend time in the hospital. Patient is less apprehensive about his financial problems. But when asked if he is going to think about these issues once he is out of the hospital, he stated yes this will still apply. Patient expressed about his therapy sessions and stated that therapist is helping him to identify reasons behind worries and how he can react in a more therapeutic way to these worries.

## 2022-09-30 NOTE — BH INPATIENT PSYCHIATRY PROGRESS NOTE - NSBHASSESSSUMMFT_PSY_ALL_CORE
72 y/o  male, unemployed, currently domiciled with wife in [place], with PMHx of HTN, BPH, with Past psych Hx significant for a previous suicide attempt by cutting wrist in July 2021, 2 previous psych admissions at [facility] and [facility], admitted to inpatient geriatric unit after worsening anxiety prompting wife to make hotline call over the weekend that she could not take care of the patient anymore.    Patient was maintained on Remeron 30 mg qhs, Seroquel 25 mg TID, and Klonopin 0.25 q AM & q afternoon. Patient's mood and mental status was maintained well until 1 month ago when his daughter decided to move out and patient felt more financial burden. 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     At Geriatric clinic, patient signed voluntary papers but only after much prompting from his wife.     Clinical update 9/30:   Patient is still anxious and apprehensive, but to lower extent. Patient's Effexor is increased this morning from 37.5 to 75. Patient's blood pressure recorded over the last 2 days is around 160-170 before taking Norvasc.    Plan:  1. Legal status: Voluntary Admission to the Inpatient Geriatric Unit (9:13)  2. Inpatient therapeutic milieu  3. Continue with Psychotropic Home medications:  Remeron 30 mg qhs, Seroquel 25 mg TID   Continue with Klonopin 0.25 mg TID PRN for anxiety   For Anxiety: Increase Venlafaxine from 37.5 mg XR to 75 mg q AM for anxiety   Plan is to increase Venlafaxine on Monday by 37.5 mg   Will increase dose of Effexor from 37.5 to 75 on (9/30)   4. Medical management:    HTN:      Will increase Norvasc from 5 to 7.5 mg qd.      Will continue with Lisinopril 20 mg qd      HLD: Continue with Atorvastatin 10 mg qhs    Hemorrhoids: Continue with Preparation H : Rectal application 3 times / day. 71 y/o  male, unemployed, currently domiciled with wife in [place], with PMHx of HTN, BPH, with Past psych Hx significant for a previous suicide attempt by cutting wrist in July 2021, 2 previous psych admissions at [facility] and [facility], admitted to inpatient geriatric unit after worsening anxiety prompting wife to make hotline call over the weekend that she could not take care of the patient anymore.    Patient was maintained on Remeron 30 mg qhs, Seroquel 25 mg TID, and Klonopin 0.25 q AM & q afternoon. Patient's mood and mental status was maintained well until 1 month ago when his daughter decided to move out and patient felt more financial burden. 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     At Geriatric clinic, patient signed voluntary papers but only after much prompting from his wife.     Clinical update 9/30:   Patient is still anxious and apprehensive, but to lower extent. Patient's Effexor is increased this morning from 37.5 to 75. Patient's blood pressure recorded over the last 2 days is around 160-170 before taking Norvasc.    Plan:  1. Legal status: Voluntary Admission to the Inpatient Geriatric Unit (9:13)  2. Inpatient therapeutic milieu  3. Continue with Psychotropic Home medications:  Remeron 30 mg qhs, Seroquel 25 mg TID   Continue with Klonopin 0.25 mg TID PRN for anxiety   For Anxiety: Increase Venlafaxine from 37.5 mg XR to 75 mg q AM for anxiety   Plan is to increase Venlafaxine on Monday by 37.5 mg   Will increase dose of Effexor from 37.5 to 75 on (9/30)   4. Medical management:    HTN:      Will increase Norvasc from 5 to 7.5 mg qd.      Will continue with Lisinopril 20 mg qd      HLD: Continue with Atorvastatin 10 mg qhs    Hemorrhoids: Continue with Preparation H : Rectal application 3 times / day. 71 y/o  male, unemployed, currently domiciled with wife in [place], with PMHx of HTN, BPH, with Past psych Hx significant for a previous suicide attempt by cutting wrist in July 2021, 2 previous psych admissions at [facility] and [facility], admitted to inpatient geriatric unit after worsening anxiety prompting wife to make hotline call over the weekend that she could not take care of the patient anymore.    Patient was maintained on Remeron 30 mg qhs, Seroquel 25 mg TID, and Klonopin 0.25 q AM & q afternoon. Patient's mood and mental status was maintained well until 1 month ago when his daughter decided to move out and patient felt more financial burden. 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     At Geriatric clinic, patient signed voluntary papers but only after much prompting from his wife.     Clinical update 9/30:   Patient is still anxious and apprehensive, but to lower extent. Patient's Effexor is increased this morning from 37.5 to 75. Patient's blood pressure recorded over the last 2 days is around 160-170 before taking Norvasc.    Plan:  1. Legal status: Voluntary Admission to the Inpatient Geriatric Unit (9:13)  2. Inpatient therapeutic milieu  3. Continue with Psychotropic Home medications:  Remeron 30 mg qhs, Seroquel 25 mg TID   Continue with Klonopin 0.25 mg TID PRN for anxiety   For Anxiety:   Increase Venlafaxine from 37.5 mg XR to 75 mg q AM for anxiety on (9/30)   Plan is to increase Venlafaxine on Monday (10/3/22) by 37.5 mg   4. Medical management:    HTN:      Will increase Norvasc from 5 to 7.5 mg qd.      Will continue with Lisinopril 20 mg qd      HLD: Continue with Atorvastatin 10 mg qhs    Hemorrhoids: Continue with Preparation H : Rectal application 3 times / day. 2 y/o  male, unemployed, currently domiciled with wife in [place], with PMHx of HTN, BPH, with Past psych Hx significant for a previous suicide attempt by cutting wrist in July 2021, 2 previous psych admissions at [facility] and [facility], admitted to inpatient geriatric unit after worsening anxiety prompting wife to make hotline call over the weekend that she could not take care of the patient anymore.    Patient was maintained on Remeron 30 mg qhs, Seroquel 25 mg TID, and Klonopin 0.25 q AM & q afternoon. Patient's mood and mental status was maintained well until 1 month ago when his daughter decided to move out and patient felt more financial burden. 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     At Geriatric clinic, patient signed voluntary papers but only after much prompting from his wife.     Clinical update 10/03:   Patient continues to have anxiety and apprehension. But his affect is less anxious. Hemrrohids are reportdely shrunken with no bleeding per rectum any longer. Patient reoports regular bowel movements with no change in stool consistency. Patient was maintained on Klonopin 0.25 mg TID PRN for anxiety. Patient's Klonopin will be reduced to Klonopin 0.25 mg q 6 AM & 0.25 mg qd PRN for anxiety.     Plan:  1. Legal status: Voluntary Admission to the Unit (9:13)  2. Inpatient therapeutic milieu  3. Continue with Psychotropic Home medications:  Remeron 30 mg qhs, Seroquel 25 mg TID   Continue with Klonopin 0.25 mg TID PRN for anxiety   For Anxiety:   Will increase Venlafaxine today (10/4/22) to 112.5 mg qd  Will Start Klonopin 0.25 mg q 6AM  Will start  Klonopin 0.25 mg q PRN for Anxiety    4. Medical management:    HTN:      Will Continue with Norvasc 7.5 mg qd.      Will continue with Lisinopril 20 mg qd      Will Continue with DASH Diet     HLD:     Will Continue with Atorvastatin 10 mg qhs    Hemorrhoids:    Will DC Preparation H as Hemorrhoids     Will DC Senna

## 2022-09-30 NOTE — BH INPATIENT PSYCHIATRY PROGRESS NOTE - PRN MEDS
MEDICATIONS  (PRN):  clonazePAM  Tablet 0.25 milliGRAM(s) Oral three times a day PRN Anxiety   MEDICATIONS  (PRN):  clonazePAM  Tablet 0.25 milliGRAM(s) Oral daily PRN Anxiety

## 2022-09-30 NOTE — BH INPATIENT PSYCHIATRY PROGRESS NOTE - NSBHMETABOLIC_PSY_ALL_CORE_FT
BMI: BMI (kg/m2): 23.6 (09-26-22 @ 12:32)  HbA1c:   Glucose:   BP: --  Lipid Panel: Date/Time: 09-26-22 @ 17:02  Cholesterol, Serum: 154  Direct LDL: --  HDL Cholesterol, Serum: 50  Total Cholesterol/HDL Ration Measurement: --  Triglycerides, Serum: 97   BMI: BMI (kg/m2): 23.6 (09-26-22 @ 12:32)  Lipid Panel: Date/Time: 09-26-22 @ 17:02  Cholesterol, Serum: 154  HDL Cholesterol, Serum: 50  Triglycerides, Serum: 97

## 2022-09-30 NOTE — BH INPATIENT PSYCHIATRY PROGRESS NOTE - NSBHATTESTCOMMENTATTENDFT_PSY_A_CORE
Patient with somewhat improved affect. he is also pacing less and feels more hopeful about his future. Venlafaxine XR increased to 75mg daily; will continue to titrate dose as tolerated. Patient's BP is mildly elevated and med changes made as per hospitalist recommendations. Rest of the history, exam, assessment, and plan as documented in Dr. Sinclair's note. Patient with somewhat improved affect. He is also pacing less and feels more hopeful about his future. Venlafaxine XR increased to 75mg daily; will continue to titrate dose as tolerated. Patient's BP is mildly elevated and med changes made as per hospitalist recommendations. Rest of the history, exam, assessment, and plan as documented in Dr. Sinclair's note.

## 2022-09-30 NOTE — BH INPATIENT PSYCHIATRY PROGRESS NOTE - MSE UNSTRUCTURED FT
Patient is a 71 YO  male looks younger than his stated age. On initial presentation when he first came in to the unit, patient is anxious, apprehensive wandering around during the whole time of the interview.  Patient did not maintain constant eye to eye contact due to persistent wandering. Patient was in depressed mood with anxious distress, with observed psychomotor hyperactivity. Patient was cooperative and reliable historian. Patient denied any current Suicidal ideations either active or passive. Patient denied any perceptual abnormalities. Insight is intact with poor judgement. Impulse control was poor during the interview. However, impulse control was regained after interview.  Patient is a 71 YO  male looks younger than his stated age. Patient is anxious, apprehensive wandering around during the whole time of the interview.  Patient did not maintain constant eye to eye contact due to persistent wandering. Patient was in depressed mood with anxious distress, with observed psychomotor hyperactivity. Patient was cooperative and reliable historian. Patient denied any current Suicidal ideations either active or passive. Patient denied any perceptual abnormalities. Insight is intact with poor judgement. Impulse control was poor during the interview. However, impulse control was regained after interview.

## 2022-09-30 NOTE — BH INPATIENT PSYCHIATRY PROGRESS NOTE - CURRENT MEDICATION
MEDICATIONS  (STANDING):  amLODIPine   Tablet 5 milliGRAM(s) Oral daily  atorvastatin 10 milliGRAM(s) Oral at bedtime  hemorrhoidal Ointment 1 Application(s) Rectal three times a day  lisinopril 20 milliGRAM(s) Oral daily  mirtazapine 30 milliGRAM(s) Oral at bedtime  QUEtiapine 25 milliGRAM(s) Oral three times a day  senna 2 Tablet(s) Oral at bedtime  venlafaxine XR 75 milliGRAM(s) Oral daily    MEDICATIONS  (PRN):  clonazePAM  Tablet 0.25 milliGRAM(s) Oral three times a day PRN Anxiety   MEDICATIONS  (STANDING):  atorvastatin 10 milliGRAM(s) Oral at bedtime  hemorrhoidal Ointment 1 Application(s) Rectal three times a day  lisinopril 20 milliGRAM(s) Oral daily  mirtazapine 30 milliGRAM(s) Oral at bedtime  QUEtiapine 25 milliGRAM(s) Oral three times a day  senna 2 Tablet(s) Oral at bedtime  venlafaxine XR 75 milliGRAM(s) Oral daily    MEDICATIONS  (PRN):  clonazePAM  Tablet 0.25 milliGRAM(s) Oral three times a day PRN Anxiety   MEDICATIONS  (STANDING):  amLODIPine   Tablet 7.5 milliGRAM(s) Oral daily  atorvastatin 10 milliGRAM(s) Oral at bedtime  hemorrhoidal Ointment 1 Application(s) Rectal three times a day  lisinopril 20 milliGRAM(s) Oral daily  mirtazapine 30 milliGRAM(s) Oral at bedtime  QUEtiapine 25 milliGRAM(s) Oral three times a day  senna 2 Tablet(s) Oral at bedtime    MEDICATIONS  (PRN):  clonazePAM  Tablet 0.25 milliGRAM(s) Oral three times a day PRN Anxiety   MEDICATIONS  (STANDING):  amLODIPine   Tablet 7.5 milliGRAM(s) Oral daily  atorvastatin 10 milliGRAM(s) Oral at bedtime  clonazePAM  Tablet 0.25 milliGRAM(s) Oral <User Schedule>  lisinopril 20 milliGRAM(s) Oral daily  mirtazapine 30 milliGRAM(s) Oral at bedtime  QUEtiapine 25 milliGRAM(s) Oral three times a day  senna 2 Tablet(s) Oral at bedtime  venlafaxine .5 milliGRAM(s) Oral daily    MEDICATIONS  (PRN):  clonazePAM  Tablet 0.25 milliGRAM(s) Oral daily PRN Anxiety

## 2022-10-01 RX ADMIN — QUETIAPINE FUMARATE 25 MILLIGRAM(S): 200 TABLET, FILM COATED ORAL at 12:34

## 2022-10-01 RX ADMIN — ATORVASTATIN CALCIUM 10 MILLIGRAM(S): 80 TABLET, FILM COATED ORAL at 20:51

## 2022-10-01 RX ADMIN — QUETIAPINE FUMARATE 25 MILLIGRAM(S): 200 TABLET, FILM COATED ORAL at 20:51

## 2022-10-01 RX ADMIN — MIRTAZAPINE 30 MILLIGRAM(S): 45 TABLET, ORALLY DISINTEGRATING ORAL at 20:51

## 2022-10-01 RX ADMIN — QUETIAPINE FUMARATE 25 MILLIGRAM(S): 200 TABLET, FILM COATED ORAL at 09:17

## 2022-10-01 RX ADMIN — AMLODIPINE BESYLATE 7.5 MILLIGRAM(S): 2.5 TABLET ORAL at 09:16

## 2022-10-01 RX ADMIN — Medication 0.25 MILLIGRAM(S): at 11:03

## 2022-10-01 RX ADMIN — Medication 75 MILLIGRAM(S): at 09:16

## 2022-10-01 RX ADMIN — Medication 0.25 MILLIGRAM(S): at 20:51

## 2022-10-01 RX ADMIN — LISINOPRIL 20 MILLIGRAM(S): 2.5 TABLET ORAL at 09:17

## 2022-10-01 RX ADMIN — SENNA PLUS 2 TABLET(S): 8.6 TABLET ORAL at 20:51

## 2022-10-02 RX ADMIN — MIRTAZAPINE 30 MILLIGRAM(S): 45 TABLET, ORALLY DISINTEGRATING ORAL at 21:13

## 2022-10-02 RX ADMIN — QUETIAPINE FUMARATE 25 MILLIGRAM(S): 200 TABLET, FILM COATED ORAL at 08:07

## 2022-10-02 RX ADMIN — Medication 0.25 MILLIGRAM(S): at 07:02

## 2022-10-02 RX ADMIN — LISINOPRIL 20 MILLIGRAM(S): 2.5 TABLET ORAL at 08:07

## 2022-10-02 RX ADMIN — QUETIAPINE FUMARATE 25 MILLIGRAM(S): 200 TABLET, FILM COATED ORAL at 12:05

## 2022-10-02 RX ADMIN — QUETIAPINE FUMARATE 25 MILLIGRAM(S): 200 TABLET, FILM COATED ORAL at 21:14

## 2022-10-02 RX ADMIN — Medication 0.25 MILLIGRAM(S): at 20:00

## 2022-10-02 RX ADMIN — AMLODIPINE BESYLATE 7.5 MILLIGRAM(S): 2.5 TABLET ORAL at 08:06

## 2022-10-02 RX ADMIN — ATORVASTATIN CALCIUM 10 MILLIGRAM(S): 80 TABLET, FILM COATED ORAL at 21:13

## 2022-10-02 RX ADMIN — SENNA PLUS 2 TABLET(S): 8.6 TABLET ORAL at 21:14

## 2022-10-02 RX ADMIN — Medication 75 MILLIGRAM(S): at 08:07

## 2022-10-03 PROCEDURE — 99231 SBSQ HOSP IP/OBS SF/LOW 25: CPT | Mod: GC

## 2022-10-03 RX ORDER — VENLAFAXINE HCL 75 MG
112.5 CAPSULE, EXT RELEASE 24 HR ORAL DAILY
Refills: 0 | Status: DISCONTINUED | OUTPATIENT
Start: 2022-10-04 | End: 2022-10-05

## 2022-10-03 RX ADMIN — Medication 0.25 MILLIGRAM(S): at 19:33

## 2022-10-03 RX ADMIN — LISINOPRIL 20 MILLIGRAM(S): 2.5 TABLET ORAL at 08:54

## 2022-10-03 RX ADMIN — ATORVASTATIN CALCIUM 10 MILLIGRAM(S): 80 TABLET, FILM COATED ORAL at 21:25

## 2022-10-03 RX ADMIN — QUETIAPINE FUMARATE 25 MILLIGRAM(S): 200 TABLET, FILM COATED ORAL at 13:09

## 2022-10-03 RX ADMIN — QUETIAPINE FUMARATE 25 MILLIGRAM(S): 200 TABLET, FILM COATED ORAL at 21:26

## 2022-10-03 RX ADMIN — AMLODIPINE BESYLATE 7.5 MILLIGRAM(S): 2.5 TABLET ORAL at 08:54

## 2022-10-03 RX ADMIN — Medication 0.25 MILLIGRAM(S): at 13:09

## 2022-10-03 RX ADMIN — Medication 75 MILLIGRAM(S): at 08:53

## 2022-10-03 RX ADMIN — MIRTAZAPINE 30 MILLIGRAM(S): 45 TABLET, ORALLY DISINTEGRATING ORAL at 21:25

## 2022-10-03 RX ADMIN — SENNA PLUS 2 TABLET(S): 8.6 TABLET ORAL at 21:25

## 2022-10-03 RX ADMIN — QUETIAPINE FUMARATE 25 MILLIGRAM(S): 200 TABLET, FILM COATED ORAL at 08:55

## 2022-10-03 RX ADMIN — PHENYLEPHRINE-SHARK LIVER OIL-MINERAL OIL-PETROLATUM RECTAL OINTMENT 1 APPLICATION(S): at 21:28

## 2022-10-03 NOTE — BH DISCHARGE NOTE NURSING/SOCIAL WORK/PSYCH REHAB - NSDCADDINFO1FT_PSY_ALL_CORE
10/12 @ 1 DR. ANDERSON    you will be sent an email link for a zoom session. please be on line at least 5 minutes in  advance.   you have provided [email or web address]      10/12 @ 1 DR. ANDERSON    you will be sent an email link for a zoom session. please be on line at least 5 minutes in  advance.   you have provided [email or web address]    [facility] - [address] [place] [address]  Tour scheduled for Tuesday, October 11th at 2:00 pm       10/12 @ 1 DR. ANDERSON    you will be sent an email link for a zoom session. please be on line at least 5 minutes in  advance.   you have provided [email or web address]

## 2022-10-03 NOTE — BH DISCHARGE NOTE NURSING/SOCIAL WORK/PSYCH REHAB - NSBHDCADDR2FT_A_CORE
[facility] - [address] [place] [address]  Tour scheduled for Tuesday, October 11th at 2:00 pm

## 2022-10-03 NOTE — BH DISCHARGE NOTE NURSING/SOCIAL WORK/PSYCH REHAB - CAREGIVER ADDRESS
[phone]  [facility] [address]  [facility]

## 2022-10-03 NOTE — BH DISCHARGE NOTE NURSING/SOCIAL WORK/PSYCH REHAB - DISCHARGE INSTRUCTIONS AFTERCARE APPOINTMENTS
In order to check the location, date, or time of your aftercare appointment, please refer to your Discharge Instructions Document given to you upon leaving the hospital.  If you have lost the instructions please call [phone]

## 2022-10-03 NOTE — BH DISCHARGE NOTE NURSING/SOCIAL WORK/PSYCH REHAB - NSDCPRRECOMMEND_PSY_ALL_CORE
Psychiatric Rehabilitation staff recommends that patient continues treatment with [facility] for ongoing medication management, support, and psychotherapy. In addition, psych rehab recommends patient continue to explore and utilize coping skills for improved symptom management and sustained recovery.

## 2022-10-03 NOTE — BH INPATIENT PSYCHIATRY PROGRESS NOTE - MSE UNSTRUCTURED FT
Patient is a 71 YO  male looks younger than his stated age. On initial presentation when he first came in to the unit, patient is anxious, apprehensive wandering around during the whole time of the interview.  Patient did not maintain constant eye to eye contact due to persistent wandering. Patient was in depressed mood with anxious distress, with observed psychomotor hyperactivity. Patient was cooperative and reliable historian. Patient denied any current Suicidal ideations either active or passive. Patient denied any perceptual abnormalities. Insight is intact with poor judgement. Impulse control was poor during the interview. However, impulse control was regained after interview.  Older male, looks younger than his stated age. Cooperative on encounter with fair eye contact. + PMA. No abnormal movements. Steady gait observed. Depressed mood with anxious affect. Thought process is linear. Thought content notable for catastrophic thinking. Patient denies any current Suicidal ideations either active or passive. Patient denies any perceptual abnormalities. Insight is intact with fair judgement. Impulse control intact at the time of exam. Oriented X3

## 2022-10-03 NOTE — BH DISCHARGE NOTE NURSING/SOCIAL WORK/PSYCH REHAB - NSCDUDCCRISIS_PSY_A_CORE
Critical access hospital [facility]  [phone] Critical access hospital-[facility]LL [phone])  Text "[phone]" to [phone]  Website: [email or web address]  [facility]  [address] [place] [id number] [phone]   Hours:  Monday through Friday from 9 AM to 3 PM

## 2022-10-03 NOTE — BH DISCHARGE NOTE NURSING/SOCIAL WORK/PSYCH REHAB - NSDCSUICIDEDEAD_PSY_ALL_CORE
.Pt given d/c instructions. Pt able to illustrate understanding as evidence by verbal feed-back of information given. Pt is stable for d/c at present time. Pt has no further concerns at present time. Pt able to ambulate.  
ERP at bedside.   
Patient brought back to room from [facility]. Patient with complains of increased cough and fever. Since 3/10/20. Patient was seen in ED 3/17 for covid rule out. Patient also with wound on right shoulder. Pain present. Patient also has right arm tenderness and concern for blood clots due to hst of blood clots. VSS. Patient provided with call light.   Chart marked for ERP.   
US in Room time now  
No

## 2022-10-03 NOTE — BH DISCHARGE NOTE NURSING/SOCIAL WORK/PSYCH REHAB - NSDCPRGOAL_PSY_ALL_CORE
Writer met with patient to discuss patient’s discharge and progress towards rehabilitation goals. Patient was pleasant and cooperative to meet with writer. Patient was able to engage in safety planning and was given a survey prior to discharge. Patient was admitted to [facility] on 9/26/2022 due to worsening anxiety. During current hospitalization, patient demonstrated fair improvements in symptoms. Patient presents as less anxious with decreased anxiety, though he endorsed still feeling nervous. Patient is motivated to engage in outpatient treatment and was given a referral to attend programing at [facility]. Patient endorsed fair sleep and appetite. Patient was adherent to medication and reported no side effects. Patient denied suicidal/homicidal ideation. Patient denied auditory/visual hallucinations. Patient stated that he is looking forward to having a meal and spending time with his wife once he is discharged. Patient has met his psychiatric rehabilitation goal of identifying and utilizing coping skills to manage anxiety. Patient identified breathing, progressive muscle relaxation, distraction techniques such as word searches, playing ping-pong and walking as ways to self-soothe and better manage anxiety. Due to the COVID-19 pandemic, unit structure and activities are re-evaluated on a consistent basis in effort to maintain the safety of patients and staff. Patient attended psychiatric rehabilitation groups and was engaged during sessions. Patient was visible on the unit and demonstrated fair behavior control. Patient was social with select peers and cooperative with staff.

## 2022-10-03 NOTE — BH DISCHARGE NOTE NURSING/SOCIAL WORK/PSYCH REHAB - PATIENT PORTAL LINK FT
You can access the FollowMyHealth Patient Portal offered by [facility] by registering at the following website: [email or web address]. By joining [facility]’s FollowMyHealth portal, you will also be able to view your health information using other applications (apps) compatible with our system.

## 2022-10-03 NOTE — BH INPATIENT PSYCHIATRY PROGRESS NOTE - NSBHATTESTCOMMENTATTENDFT_PSY_A_CORE
Patient less anxious but still perseverative about financial worries; able to tolerate interview; keen on returning home. Venlafaxine XR increased to 112.5mg daily starting tomorrow; will continue to titrate dose as tolerated. Rest of the history, exam, assessment, and plan as documented in Dr. Sinclair's note.

## 2022-10-03 NOTE — BH INPATIENT PSYCHIATRY PROGRESS NOTE - CURRENT MEDICATION
MEDICATIONS  (STANDING):  amLODIPine   Tablet 7.5 milliGRAM(s) Oral daily  atorvastatin 10 milliGRAM(s) Oral at bedtime  hemorrhoidal Ointment 1 Application(s) Rectal three times a day  lisinopril 20 milliGRAM(s) Oral daily  mirtazapine 30 milliGRAM(s) Oral at bedtime  QUEtiapine 25 milliGRAM(s) Oral three times a day  senna 2 Tablet(s) Oral at bedtime  venlafaxine XR 75 milliGRAM(s) Oral daily    MEDICATIONS  (PRN):  clonazePAM  Tablet 0.25 milliGRAM(s) Oral three times a day PRN Anxiety   MEDICATIONS  (STANDING):  amLODIPine   Tablet 7.5 milliGRAM(s) Oral daily  atorvastatin 10 milliGRAM(s) Oral at bedtime  hemorrhoidal Ointment 1 Application(s) Rectal three times a day  lisinopril 20 milliGRAM(s) Oral daily  mirtazapine 30 milliGRAM(s) Oral at bedtime  QUEtiapine 25 milliGRAM(s) Oral three times a day  senna 2 Tablet(s) Oral at bedtime    MEDICATIONS  (PRN):  clonazePAM  Tablet 0.25 milliGRAM(s) Oral three times a day PRN Anxiety

## 2022-10-03 NOTE — BH INPATIENT PSYCHIATRY PROGRESS NOTE - NSBHASSESSSUMMFT_PSY_ALL_CORE
71 y/o  male, unemployed, currently domiciled with wife in [place], with PMHx of HTN, BPH, with Past psych Hx significant for a previous suicide attempt by cutting wrist in July 2021, 2 previous psych admissions at [facility] and [facility], admitted to inpatient geriatric unit after worsening anxiety prompting wife to make hotline call over the weekend that she could not take care of the patient anymore.    Patient was maintained on Remeron 30 mg qhs, Seroquel 25 mg TID, and Klonopin 0.25 q AM & q afternoon. Patient's mood and mental status was maintained well until 1 month ago when his daughter decided to move out and patient felt more financial burden. 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     At Geriatric clinic, patient signed voluntary papers but only after much prompting from his wife.     Clinical update 9/30:   Patient is still anxious about handling financial issues. He continues to be apprehensive about his wife's situation and worried about her car parking lot, which may hinder her form attending work. Patient     Plan:  1. Legal status: Voluntary Admission to the Inpatient Geriatric Unit (9:13)  2. Inpatient therapeutic milieu  3. Continue with Psychotropic Home medications:  Remeron 30 mg qhs, Seroquel 25 mg TID   Continue with Klonopin 0.25 mg TID PRN for anxiety   For Anxiety:   Will Continue with [id number] mg q AM for anxiety on (9/30)   Plan is to increase Venlafaxine on Tuesday (10/4/22) by 37.5 mg   4. Medical management: 73 y/o  male, unemployed, currently domiciled with wife in [place], with PMHx of HTN, BPH, with Past psych Hx significant for a previous suicide attempt by cutting wrist in July 2021, 2 previous psych admissions at [facility] and [facility], admitted to inpatient geriatric unit after worsening anxiety prompting wife to make hotline call over the weekend that she could not take care of the patient anymore.    Patient was maintained on Remeron 30 mg qhs, Seroquel 25 mg TID, and Klonopin 0.25 q AM & q afternoon. Patient's mood and mental status was maintained well until 1 month ago when his daughter decided to move out and patient felt more financial burden. 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     At Geriatric clinic, patient signed voluntary papers but only after much prompting from his wife.     Clinical update 10/03:   Patient is still anxious about handling financial issues. He continues to be apprehensive about his wife's situation and worried about her car parking lot, which may hinder her form attending work. Patient     Plan:  1. Legal status: Voluntary Admission to the Inpatient Geriatric Unit (9:13)  2. Inpatient therapeutic milieu  3. Continue with Psychotropic Home medications:  Remeron 30 mg qhs, Seroquel 25 mg TID   Continue with Klonopin 0.25 mg TID PRN for anxiety   For Anxiety:   Will Continue with Omvneeasygu22 mg q AM for anxiety on (9/30)   Plan is to increase Venlafaxine on Tuesday (10/4/22) by 37.5 mg   4. Medical management:

## 2022-10-03 NOTE — BH DISCHARGE NOTE NURSING/SOCIAL WORK/PSYCH REHAB - NSDPDISTO_PSY_ALL_CORE
You reside  with your wife at [phone]  [address]  [facility] [id number]  cell /Home You reside  with your wife at [phone]  [address]  [facility] [id number]  cell /Home

## 2022-10-03 NOTE — BH INPATIENT PSYCHIATRY PROGRESS NOTE - NSBHFUPINTERVALHXFT_PSY_A_CORE
Patient continues to be anxious, but to lesser extent. He endorsed that he is not feeling anxious about staying here at the unit anymore ands he started to shift his target of anxiety on how to deal with the current financial stressors. In spite of his wife's constant assurance, he still anxious and apprehensive about the financial issues he is going through. Patient still has this apprehension about his wife's car parking. He is afraid she might not be able to go to work and she might be fired and have to suffer from the same stress again.

## 2022-10-04 PROCEDURE — 99232 SBSQ HOSP IP/OBS MODERATE 35: CPT | Mod: GC

## 2022-10-04 RX ORDER — CLONAZEPAM 1 MG
0.25 TABLET ORAL ONCE
Refills: 0 | Status: DISCONTINUED | OUTPATIENT
Start: 2022-10-04 | End: 2022-10-04

## 2022-10-04 RX ORDER — CLONAZEPAM 1 MG
0.25 TABLET ORAL DAILY
Refills: 0 | Status: DISCONTINUED | OUTPATIENT
Start: 2022-10-04 | End: 2022-10-07

## 2022-10-04 RX ORDER — CLONAZEPAM 1 MG
0.25 TABLET ORAL
Refills: 0 | Status: DISCONTINUED | OUTPATIENT
Start: 2022-10-04 | End: 2022-10-07

## 2022-10-04 RX ADMIN — AMLODIPINE BESYLATE 7.5 MILLIGRAM(S): 2.5 TABLET ORAL at 08:05

## 2022-10-04 RX ADMIN — Medication 112.5 MILLIGRAM(S): at 08:05

## 2022-10-04 RX ADMIN — QUETIAPINE FUMARATE 25 MILLIGRAM(S): 200 TABLET, FILM COATED ORAL at 21:24

## 2022-10-04 RX ADMIN — MIRTAZAPINE 30 MILLIGRAM(S): 45 TABLET, ORALLY DISINTEGRATING ORAL at 21:24

## 2022-10-04 RX ADMIN — SENNA PLUS 2 TABLET(S): 8.6 TABLET ORAL at 21:23

## 2022-10-04 RX ADMIN — LISINOPRIL 20 MILLIGRAM(S): 2.5 TABLET ORAL at 08:05

## 2022-10-04 RX ADMIN — QUETIAPINE FUMARATE 25 MILLIGRAM(S): 200 TABLET, FILM COATED ORAL at 08:06

## 2022-10-04 RX ADMIN — Medication 0.25 MILLIGRAM(S): at 19:17

## 2022-10-04 RX ADMIN — ATORVASTATIN CALCIUM 10 MILLIGRAM(S): 80 TABLET, FILM COATED ORAL at 21:23

## 2022-10-04 RX ADMIN — Medication 0.25 MILLIGRAM(S): at 09:26

## 2022-10-04 RX ADMIN — QUETIAPINE FUMARATE 25 MILLIGRAM(S): 200 TABLET, FILM COATED ORAL at 12:45

## 2022-10-04 NOTE — BH INPATIENT PSYCHIATRY PROGRESS NOTE - PRN MEDS
MEDICATIONS  (PRN):   MEDICATIONS  (PRN):  clonazePAM  Tablet 0.25 milliGRAM(s) Oral daily PRN Anxiety

## 2022-10-04 NOTE — BH INPATIENT PSYCHIATRY PROGRESS NOTE - NSBHATTESTCOMMENTATTENDFT_PSY_A_CORE
Patient remains perseverative about financial concerns, but with improved insight and with no reported suicidal thoughts. Venlafaxine XR increased to 112.5mg daily starting today; will continue to titrate dose as tolerated. Rest of the history, exam, assessment, and plan as documented in Dr. Sinclair's note.

## 2022-10-04 NOTE — BH INPATIENT PSYCHIATRY PROGRESS NOTE - NSBHCHARTREVIEWVS_PSY_A_CORE FT
Vital Signs Last 24 Hrs  T(C): 36.5 (10-04-22 @ 08:08), Max: 36.8 (10-03-22 @ 10:03)  T(F): 97.7 (10-04-22 @ 08:08), Max: 98.2 (10-03-22 @ 10:03)  HR: --  BP: --  BP(mean): --  RR: --  SpO2: --    Orthostatic VS  10-04-22 @ 08:08  Lying BP: --/-- HR: --  Sitting BP: 152/72 HR: 78  Standing BP: 139/69 HR: 82  Site: --  Mode: --  Orthostatic VS  10-03-22 @ 10:03  Lying BP: --/-- HR: --  Sitting BP: 155/72 HR: 82  Standing BP: 154/76 HR: 87  Site: --  Mode: --   Vital Signs Last 24 Hrs  T(C): 36.5 (10-04-22 @ 08:08), Max: 36.6 (10-03-22 @ 16:21)  T(F): 97.7 (10-04-22 @ 08:08), Max: 97.8 (10-03-22 @ 16:21)  HR: --  BP: --  BP(mean): --  RR: --  SpO2: --    Orthostatic VS  10-04-22 @ 08:08  Lying BP: --/-- HR: --  Sitting BP: 152/72 HR: 78  Standing BP: 139/69 HR: 82  Site: --  Mode: --  Orthostatic VS  10-03-22 @ 10:03  Lying BP: --/-- HR: --  Sitting BP: 155/72 HR: 82  Standing BP: 154/76 HR: 87  Site: --  Mode: --   Vital Signs Last 24 Hrs  T(C): 37.1 (10-04-22 @ 15:47), Max: 37.1 (10-04-22 @ 15:47)  T(F): 98.7 (10-04-22 @ 15:47), Max: 98.7 (10-04-22 @ 15:47)    Orthostatic VS  10-04-22 @ 08:08  Sitting BP: 152/72 HR: 78  Standing BP: 139/69 HR: 82

## 2022-10-04 NOTE — BH INPATIENT PSYCHIATRY PROGRESS NOTE - NSBHFUPINTERVALHXFT_PSY_A_CORE
Patient was seen and evaluated by the attending psychiatrist and the resident . Case was discussed with the team. Chart was reviewed and patient is vitally stable. No acute events overnight. Patient reported that he enjoyed playing chess with one of the nurses over the weekend. This morning he felt mild surge fo anxiety regarding staying at the hospital with his major concern about financial issues. Patient reported having a Bowel movement yesterday.  Patient was seen and evaluated by the attending psychiatrist and the resident. Case was discussed with the team. Chart was reviewed and patient is vitally stable. No acute events overnight. Patient reported that he enjoyed playing chess with one of the nurses over the weekend. This morning he felt mild surge of anxiety regarding staying at the hospital with his major concern about financial issues. Patient reported having a bowel movement yesterday.

## 2022-10-04 NOTE — BH INPATIENT PSYCHIATRY PROGRESS NOTE - NSBHASSESSSUMMFT_PSY_ALL_CORE
2 y/o  male, unemployed, currently domiciled with wife in [place], with PMHx of HTN, BPH, with Past psych Hx significant for a previous suicide attempt by cutting wrist in July 2021, 2 previous psych admissions at [facility] and [facility], admitted to inpatient geriatric unit after worsening anxiety prompting wife to make hotline call over the weekend that she could not take care of the patient anymore.    Patient was maintained on Remeron 30 mg qhs, Seroquel 25 mg TID, and Klonopin 0.25 q AM & q afternoon. Patient's mood and mental status was maintained well until 1 month ago when his daughter decided to move out and patient felt more financial burden. 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     At Geriatric clinic, patient signed voluntary papers but only after much prompting from his wife.     Clinical update 10/03:   Patient continues to have anxiety and apprehension. But his affect is less anxious. Hemorrhoids are reportedly shrunken with no bleeding per rectum any longer for over 5 days. Patient reports regular bowel movements. Patient was maintained on Klonopin 0.25 mg TID PRN for anxiety. Patient's Klonopin will be reduced to Klonopin 0.25 mg q 6 AM & 0.25 mg qd PRN for anxiety.     Plan:  1. Legal status: Voluntary Admission to the Inpatient Geriatric Unit (9:13)  2. Inpatient therapeutic milieu  3. Continue with Psychotropic Home medications:  Remeron 30 mg qhs, Seroquel 25 mg TID   Continue with Klonopin 0.25 mg TID PRN for anxiety   For Anxiety:   Will increase Venlafaxine today (10/4/22) to 112.5 mg qd  Nikhil Start Klonopin 0.25 mg q 6AM  Will start  Klonopin 0.25 mg q PRN for Anxiety    4. Medical management:    HTN:      Will Continue with Norvasc 7.5 mg qd.      Will continue with Lisinopril 20 mg qd      Will Continue with DASH Diet     HLD:     Will Continue with Atorvastatin 10 mg qhs    Hemorrhoids:    Will DC Preparation H as Hemrrohids     Will DC Senna qhs  2 y/o  male, unemployed, currently domiciled with wife in [place], with PMHx of HTN, BPH, with Past psych Hx significant for a previous suicide attempt by cutting wrist in July 2021, 2 previous psych admissions at [facility] and [facility], admitted to inpatient geriatric unit after worsening anxiety prompting wife to make hotline call over the weekend that she could not take care of the patient anymore.    Patient was maintained on Remeron 30 mg qhs, Seroquel 25 mg TID, and Klonopin 0.25 q AM & q afternoon. Patient's mood and mental status was maintained well until 1 month ago when his daughter decided to move out and patient felt more financial burden. 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     At Geriatric clinic, patient signed voluntary papers but only after much prompting from his wife.     Clinical update 10/03:   Patient continues to have anxiety and apprehension. But his affect is less anxious. Hemorrhoids are reportedly shrunken with no bleeding per rectum any longer for over 5 days. Patient reports regular bowel movements. Patient was maintained on Klonopin 0.25 mg TID PRN for anxiety. Patient's Klonopin will be reduced to Klonopin 0.25 mg q 6 AM & 0.25 mg qd PRN for anxiety.     Plan:  1. Legal status: Voluntary Admission to the Inpatient Geriatric Unit (9:13)  2. Inpatient therapeutic milieu  3. Continue with Psychotropic Home medications:  Remeron 30 mg qhs, Seroquel 25 mg TID   Continue with Klonopin 0.25 mg TID PRN for anxiety   For Anxiety:   Will increase Venlafaxine today (10/4/22) to 112.5 mg qd  Nikhil Start Klonopin 0.25 mg q 6AM  Will start  Klonopin 0.25 mg q PRN for Anxiety    4. Medical management:    HTN:      Will Continue with Norvasc 7.5 mg qd.      Will continue with Lisinopril 20 mg qd      Will Continue with DASH Diet     HLD:     Will Continue with Atorvastatin 10 mg qhs    Hemorrhoids:    Will DC Preparation H cream application     Will DC Senna qhs  2 y/o  male, unemployed, currently domiciled with wife in [place], with PMHx of HTN, BPH, with Past psych Hx significant for a previous suicide attempt by cutting wrist in July 2021, 2 previous psych admissions at [facility] and [facility], admitted to inpatient geriatric unit after worsening anxiety prompting wife to make hotline call over the weekend that she could not take care of the patient anymore.    Patient was maintained on Remeron 30 mg qhs, Seroquel 25 mg TID, and Klonopin 0.25 q AM & q afternoon. Patient's mood and mental status was maintained well until 1 month ago when his daughter decided to move out and patient felt more financial burden. 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     At Geriatric clinic, patient signed voluntary papers but only after much prompting from his wife.     Clinical update 10/04:   Patient continues to have anxiety and apprehension. But his affect is less anxious. Hemorrhoids are reportedly shrunken with no bleeding per rectum any longer for over 5 days. Patient reports regular bowel movements. Patient was maintained on Klonopin 0.25 mg TID PRN for anxiety. Patient's Klonopin will be reduced to Klonopin 0.25 mg q 6 AM & 0.25 mg qd PRN for anxiety.     Plan:  1. Legal status: Voluntary Admission to the Inpatient Geriatric Unit (9:13)  2. Inpatient therapeutic milieu  3. Continue with Psychotropic Home medications:  Remeron 30 mg qhs, Seroquel 25 mg TID   Continue with Klonopin 0.25 mg TID PRN for anxiety   For Anxiety:   Will increase Venlafaxine today (10/4/22) to 112.5 mg qd  Will Start Klonopin 0.25mg at 6AM  Will reduce PRN Klonopin to 0.25mg q daily PRN for anxiety    4. Medical management:    HTN:      Will Continue with Norvasc 7.5 mg qd.      Will continue with Lisinopril 20 mg qd      Will Continue with DASH Diet     HLD:     Will Continue with Atorvastatin 10 mg qhs    Hemorrhoids:    Will DC Preparation H cream application     Will DC Senna qhs

## 2022-10-04 NOTE — BH INPATIENT PSYCHIATRY PROGRESS NOTE - CURRENT MEDICATION
MEDICATIONS  (STANDING):  amLODIPine   Tablet 7.5 milliGRAM(s) Oral daily  atorvastatin 10 milliGRAM(s) Oral at bedtime  hemorrhoidal Ointment 1 Application(s) Rectal three times a day  lisinopril 20 milliGRAM(s) Oral daily  mirtazapine 30 milliGRAM(s) Oral at bedtime  QUEtiapine 25 milliGRAM(s) Oral three times a day  senna 2 Tablet(s) Oral at bedtime  venlafaxine .5 milliGRAM(s) Oral daily    MEDICATIONS  (PRN):   MEDICATIONS  (STANDING):  amLODIPine   Tablet 7.5 milliGRAM(s) Oral daily  atorvastatin 10 milliGRAM(s) Oral at bedtime  clonazePAM  Tablet 0.25 milliGRAM(s) Oral <User Schedule>  hemorrhoidal Ointment 1 Application(s) Rectal three times a day  lisinopril 20 milliGRAM(s) Oral daily  mirtazapine 30 milliGRAM(s) Oral at bedtime  QUEtiapine 25 milliGRAM(s) Oral three times a day  senna 2 Tablet(s) Oral at bedtime  venlafaxine .5 milliGRAM(s) Oral daily    MEDICATIONS  (PRN):  clonazePAM  Tablet 0.25 milliGRAM(s) Oral daily PRN Anxiety   MEDICATIONS  (STANDING):  amLODIPine   Tablet 7.5 milliGRAM(s) Oral daily  atorvastatin 10 milliGRAM(s) Oral at bedtime  clonazePAM  Tablet 0.25 milliGRAM(s) Oral <User Schedule>  lisinopril 20 milliGRAM(s) Oral daily  mirtazapine 30 milliGRAM(s) Oral at bedtime  QUEtiapine 25 milliGRAM(s) Oral three times a day  senna 2 Tablet(s) Oral at bedtime  venlafaxine .5 milliGRAM(s) Oral daily    MEDICATIONS  (PRN):  clonazePAM  Tablet 0.25 milliGRAM(s) Oral daily PRN Anxiety

## 2022-10-04 NOTE — BH INPATIENT PSYCHIATRY PROGRESS NOTE - MSE UNSTRUCTURED FT
Older male, looks younger than his stated age. Cooperative on encounter with fair eye contact. + PMA. No abnormal movements. Steady gait observed. Depressed mood with anxious affect. Thought process is linear. Thought content notable for catastrophic thinking. Patient denies any current Suicidal ideations either active or passive. Patient denies any perceptual abnormalities. Insight is intact with fair judgement. Impulse control intact at the time of exam. Oriented X3 Older male, looks younger than his stated age. Cooperative on encounter with fair eye contact.  Patient is still exhibiting psychomotor hyperactivity with observable wandering around the unit. No abnormal movements were noted. Steady gait observed. Depressed mood with anxious distress is observed. Still has some affect restrictions. Monotonous speech is noted. Thought process is linear & goal directed. Thought content notable for catastrophic thinking with secondary anticipatory apprehension. Patient denies any current Suicidal ideations either active or passive. Patient denies any perceptual abnormalities. Insight is intact with fair judgement. Impulse control intact at the time of exam. Oriented X3.

## 2022-10-05 PROCEDURE — 99232 SBSQ HOSP IP/OBS MODERATE 35: CPT

## 2022-10-05 RX ORDER — MIRTAZAPINE 45 MG/1
1 TABLET, ORALLY DISINTEGRATING ORAL
Qty: 0 | Refills: 0 | DISCHARGE
Start: 2022-10-05

## 2022-10-05 RX ORDER — CLONAZEPAM 1 MG
0.5 TABLET ORAL
Qty: 0 | Refills: 0 | DISCHARGE
Start: 2022-10-05

## 2022-10-05 RX ORDER — VENLAFAXINE HCL 75 MG
150 CAPSULE, EXT RELEASE 24 HR ORAL DAILY
Refills: 0 | Status: DISCONTINUED | OUTPATIENT
Start: 2022-10-05 | End: 2022-10-07

## 2022-10-05 RX ORDER — VENLAFAXINE HCL 75 MG
1 CAPSULE, EXT RELEASE 24 HR ORAL
Qty: 30 | Refills: 0
Start: 2022-10-05 | End: 2022-11-03

## 2022-10-05 RX ORDER — LISINOPRIL 2.5 MG/1
1 TABLET ORAL
Qty: 0 | Refills: 0 | DISCHARGE
Start: 2022-10-05

## 2022-10-05 RX ORDER — AMLODIPINE BESYLATE 2.5 MG/1
3 TABLET ORAL
Qty: 0 | Refills: 0 | DISCHARGE
Start: 2022-10-05

## 2022-10-05 RX ORDER — AMLODIPINE BESYLATE AND BENAZEPRIL HYDROCHLORIDE 10; 20 MG/1; MG/1
1 CAPSULE ORAL
Qty: 0 | Refills: 0 | DISCHARGE

## 2022-10-05 RX ORDER — SENNA PLUS 8.6 MG/1
2 TABLET ORAL
Qty: 0 | Refills: 0 | DISCHARGE
Start: 2022-10-05

## 2022-10-05 RX ORDER — QUETIAPINE FUMARATE 200 MG/1
1 TABLET, FILM COATED ORAL
Qty: 0 | Refills: 0 | DISCHARGE
Start: 2022-10-05

## 2022-10-05 RX ADMIN — QUETIAPINE FUMARATE 25 MILLIGRAM(S): 200 TABLET, FILM COATED ORAL at 09:32

## 2022-10-05 RX ADMIN — ATORVASTATIN CALCIUM 10 MILLIGRAM(S): 80 TABLET, FILM COATED ORAL at 21:00

## 2022-10-05 RX ADMIN — LISINOPRIL 20 MILLIGRAM(S): 2.5 TABLET ORAL at 09:32

## 2022-10-05 RX ADMIN — QUETIAPINE FUMARATE 25 MILLIGRAM(S): 200 TABLET, FILM COATED ORAL at 21:00

## 2022-10-05 RX ADMIN — AMLODIPINE BESYLATE 7.5 MILLIGRAM(S): 2.5 TABLET ORAL at 09:31

## 2022-10-05 RX ADMIN — QUETIAPINE FUMARATE 25 MILLIGRAM(S): 200 TABLET, FILM COATED ORAL at 13:43

## 2022-10-05 RX ADMIN — Medication 0.25 MILLIGRAM(S): at 06:30

## 2022-10-05 RX ADMIN — Medication 0.25 MILLIGRAM(S): at 19:13

## 2022-10-05 RX ADMIN — SENNA PLUS 2 TABLET(S): 8.6 TABLET ORAL at 21:00

## 2022-10-05 RX ADMIN — Medication 112.5 MILLIGRAM(S): at 09:32

## 2022-10-05 RX ADMIN — MIRTAZAPINE 30 MILLIGRAM(S): 45 TABLET, ORALLY DISINTEGRATING ORAL at 21:00

## 2022-10-05 NOTE — BH INPATIENT PSYCHIATRY DISCHARGE NOTE - NSDCCPCAREPLAN_GEN_ALL_CORE_FT
PRINCIPAL DISCHARGE DIAGNOSIS  Diagnosis: HANK (generalized anxiety disorder)  Assessment and Plan of Treatment: Please continue current management and follow-up with your outpatient provider

## 2022-10-05 NOTE — BH INPATIENT PSYCHIATRY PROGRESS NOTE - NSBHCHARTREVIEWVS_PSY_A_CORE FT
Vital Signs Last 24 Hrs  T(C): 36.6 (10-05-22 @ 08:19), Max: 37.1 (10-04-22 @ 15:47)  T(F): 97.9 (10-05-22 @ 08:19), Max: 98.7 (10-04-22 @ 15:47)  HR: --  BP: --  BP(mean): --  RR: 18 (10-05-22 @ 08:19) (18 - 18)  SpO2: --    Orthostatic VS  10-05-22 @ 08:19  Lying BP: --/-- HR: --  Sitting BP: 153/73 HR: 81  Standing BP: 149/72 HR: 82  Site: --  Mode: --  Orthostatic VS  10-04-22 @ 08:08  Lying BP: --/-- HR: --  Sitting BP: 152/72 HR: 78  Standing BP: 139/69 HR: 82  Site: --  Mode: --   Vital Signs Last 24 Hrs  T(C): 36.8 (10-05-22 @ 15:37), Max: 36.8 (10-05-22 @ 15:37)  T(F): 98.2 (10-05-22 @ 15:37), Max: 98.2 (10-05-22 @ 15:37)  RR: 18 (10-05-22 @ 08:19) (18 - 18)    Orthostatic VS  10-05-22 @ 08:19  Sitting BP: 153/73 HR: 81  Standing BP: 149/72 HR: 82

## 2022-10-05 NOTE — BH INPATIENT PSYCHIATRY DISCHARGE NOTE - ATTENDING DISCHARGE PHYSICAL EXAMINATION:
Patient appears stated age with adequate grooming and hygiene. Cooperative on encounter with fair eye contact. Patient is still somewhat restless--observed pacing  the unit. No abnormal movements were noted. Steady gait observed. Reported mood "a little better" with anxious affect although with restricted range. Monotonous speech is noted. Thought process is linear & goal directed. No delusions. Patient denies any current suicidal ideations either active or passive. Patient denies any perceptual abnormalities. Insight is intact with fair judgement. Impulse control intact at the time of exam. Oriented X3.

## 2022-10-05 NOTE — BH TREATMENT PLAN - NSTXANXINTERPR_PSY_ALL_CORE
Over the next seven days, the psychiatric rehabilitation team will meet with patient to support transition to the hospital and utilize individual and group therapy to assist patient in reaching the established psych rehab goal until discharge.
Patient has demonstrated fair progress towards psychiatric rehabilitation goal of identifying and utilizing coping skills to manage anxiety. Patient identified playing chess, word searches, and sudoku puzzles as ways to help focus and decrease anxiety. Patient reported some success utilizing these skills. Patient was receptive to exploring other coping skills with writer. Together, they practiced progressive muscle relaxation and breathing exercises. Psychiatric rehabilitation recommends patient continue to attend groups, engage in individual sessions, and participate in activities in the milieu to continue exploring coping skills to manage symptoms.

## 2022-10-05 NOTE — BH TREATMENT PLAN - NSTXDCOTHRGOAL_PSY_ALL_CORE
Will demonstrate decrease in anxiety symptoms in order to participate in discharge planning.
pt will comply with treatment and improve mental status in order to effectively engage with dc planning.

## 2022-10-05 NOTE — BH TREATMENT PLAN - NSTXANXINTERMD_PSY_ALL_CORE
Med titration with SNRI; pt started on venlafaxine and is tolerating it well so far. Will also continue mirtazapine, quetiapine, and clonazepam

## 2022-10-05 NOTE — BH INPATIENT PSYCHIATRY DISCHARGE NOTE - TIME SPENT: (MINUTES SPENT ON THE DISCHARGE SERVICE)
I reviewed that chart and discussed the case. XRs without fracture. Supportive care discussed. Lungs clear. Regular rhythm. Abdomen soft.      I agree with the following clinical impression(s):  Contusion of left lower leg, initial encounter  (primary encou 25

## 2022-10-05 NOTE — BH TREATMENT PLAN - NSPTSTATEDGOAL_PSY_ALL_CORE
Pt is unable to state because he is preoccupied with hospitalization
Improved anxiety
"I want to go back home soon"

## 2022-10-05 NOTE — BH INPATIENT PSYCHIATRY DISCHARGE NOTE - NSBHMETABOLIC_PSY_ALL_CORE_FT
BMI: BMI (kg/m2): 23.6 (09-26-22 @ 12:32)  Lipid Panel: Date/Time: 09-26-22 @ 17:02  Cholesterol, Serum: 154  HDL Cholesterol, Serum: 50  Triglycerides, Serum: 97

## 2022-10-05 NOTE — BH INPATIENT PSYCHIATRY DISCHARGE NOTE - NSDCMRMEDTOKEN_GEN_ALL_CORE_FT
amLODIPine 2.5 mg oral tablet: 3 tab(s) orally once a day  clonazePAM: 0.5 milligram(s) orally once a day  clonazePAM: 0.5 milligram(s) orally once a day, As Needed  lisinopril 20 mg oral tablet: 1 tab(s) orally once a day  lovastatin 20 mg oral tablet: 1 tab(s) orally once a day  mirtazapine 30 mg oral tablet: 1 tab(s) orally once a day (at bedtime)  QUEtiapine 25 mg oral tablet: 1 tab(s) orally 3 times a day  senna leaf extract oral tablet: 2 tab(s) orally once a day (at bedtime)  venlafaxine 150 mg oral capsule, extended release: 1 cap(s) orally once a day

## 2022-10-05 NOTE — BH TREATMENT PLAN - NSTXPLANTHERAPYSESSIONSFT_PSY_ALL_CORE
10-04-22  Type of therapy: Cognitive behavior therapy,Coping skills,Creative arts therapy,Health and fitness,Leisure development,Peer advocate,Spirituality,Symptom management  Type of session: Individual  Level of patient participation: Engaged  Duration of participation: 45 minutes  Therapy conducted by: Psych rehab  Therapy Summary: Writer met with patient for an individual session in order to review progress towards psychiatric rehabilitation goals. Patient was pleasant, polite and engaged in the session. Patient was forthcoming and open while conversing with the writer. Patient has demonstrated some improvements in symptoms. Patient continues to endorse anxiety though he reports it has improved somewhat. Patient remains perseverative on finances and is moderately receptive to reality testing. Patient acknowledged his tendency to catastrophize. During session, patient was able to complete his safety plan with writer, going over warning signs, coping skills, support system and reason for living. Patient has been adherent to medication and reported no side effects. Patient endorsed fair sleep and appetite. Patient denied SI/HI/AVH. Due to the COVID-19 pandemic, unit structure and activities are re-evaluated on a consistent basis in effort to maintain the safety of patients and staff. Patient attends psychiatric rehabilitation groups and is engaged during sessions. Patient is visible on the unit and often observed pacing. Patient is social with select peers and able to make needs known to staff. Patient presents with fair ADLs.

## 2022-10-05 NOTE — BH INPATIENT PSYCHIATRY DISCHARGE NOTE - HPI (INCLUDE ILLNESS QUALITY, SEVERITY, DURATION, TIMING, CONTEXT, MODIFYING FACTORS, ASSOCIATED SIGNS AND SYMPTOMS)
70 y/o  male,  for the second time, no children, non-caregiver, unemployed, currently domiciled with wife in [place], with PMHx of HTN, BPH, with Past psych Hx significant for a previous suicide attempt by cutting wrist in July 2021, 2 previous psych admissions at [facility] and [facility], admitted to inpatient geriatric unit after worsening anxiety prompting wife to make hotline call over the weekend that she could not take care of the patient anymore. At Geriatric clinic, patient signed voluntary papers but only after much prompting from his wife. He kept pacing but eventually allowed COVID swab and agreed to sit in the waiting area with his wife.    On Evaluation at Geriatric Psych Unit: Patient was markedly anxious pacing around the unit, talking loudly to the nursing staff, trying to reach his admission papers to rip them off as he stated. Patient was then interviewed by the attending psychiatrist and the resident physician. Patient was asked to sit down and relax. He sat down for just one second, but he could not sit till. He suddenly stood up and kept wandering in the room where the interview was conducted. Patient was really apprehensive about his finances especially after he became unemployed and his daughter decided to move out, and his thoughts that she will no longer be financially participating in payment for the newly acquired apartment. Patient explained that they moved to a new apartment and then he got retired from his job. He is not able to work anymore and not participating in payment for the apartment. Patient explained his wandering while having interview that he has Claustrophobia and he feels that the place is relatively small, and evokes this phobia. Patient endorsed having panic attacks, but when screened for symptoms of panic including palpitation or perioral numbness or feeling lightheaded, he dined all of these symptoms. Patient expressed depressed mood comorbid with anxiety. he described his sleep as ok except for the last 2 weeks when he started to wake up earlier around 4 AM and not able to go back to sleep. He attributed worsening of mood and anxiety after his daughter moved out 1 months ago. But got markedly worse 2 weeks ago.    Past Psych Hx:  MDD with previous suicidal attempt in July 2021  1 day admission in [facility] just before the Suicide attempt (one week before suicide attempt)    Psych admission in [facility] after attempting suicide.     Family Hx: Mother was diagnosed with OCD. She was diagnosed with glaucoma as well.     Collateral Hx from Wife (Ms. Rondon.):  Patient’s spouse states that in the past 2 weeks patient has been more anxious at home, pacing around at home early in the morning from 3 am onwards. She states that a few weeks ago, patient had an appointment with his psychiatrist Dr. Chavez, who was increasing one of his medications each week. Dr. Chavez advised that if his medications were not effective, then they might need to consider ECT as a treatment option. Ms. Rondon does not recall which medication was being increased. Ms. Rondon states that patient became very anxious about the procedure and elicited suicidal ideation that that if the medications were no longer working that he would hurt himself. Ms. Rondon states she was prompted to bring him to the hospital after his statement. She states that in the past few weeks, patient increasingly complained of being lonely and isolated. She states that he has become very picky with his food, but eats the same quantity of food that is usual for him. Regarding medication compliance, Ms. Rondon states that patient is compliant with medications and takes them at scheduled times during the day. She also states that he sleeps regularly at the same time.   Ms. Rondon states that in the past, the patient’s anxiety appeared ever since he took the Pfizer COVID vaccine. 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 After which, there was significant financial stress and his wife also became unemployed at the time. She states that patient became withdrawn and he did not want his family or friends to find out about his unemployment because he was ashamed. She also states that due to the pandemic, the patient was more isolated and would complain of being lonely often. Ms. Rondon states that in July 2021 patient was hospitalized after an incident of self harm. She states that after he was discharged, he was doing well at home, they would go on frequent trips and he “seemed like himself” again. She states that at his baseline, he was confident, outgoing, smart and funny. Ms. 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 She states that around February 2022, patient was diagnosed with cataracts and he became anxious that he would lose his vision because his late mother had glaucoma. Ms. Rondon states that after he knew he had cataracts, he was unable to keep his job as a  due to his anxiety. However, she states that he has no problems with his vision. She states that his anxiety has returned since then and became increasing after his recent appointment with his psychiatrist, who suggested a possibility of ECT if the medications were not effective.  72-year-old male,  for the second time, no children, non-caregiver, unemployed, currently domiciled with wife in [place], with PMHx of HTN, BPH, with Past psych Hx significant for a previous suicide attempt by cutting wrist in July 2021, 2 previous psych admissions at [facility] and [facility], admitted to inpatient geriatric unit after worsening anxiety prompting wife to make hotline call over the weekend that she could not take care of the patient anymore. At geriatric clinic, patient signed voluntary papers but only after much prompting from his wife. He kept pacing but eventually allowed COVID swab and agreed to sit in the waiting area with his wife.    Collateral Hx from Wife (Ms. Rondon.):  Patient’s spouse states that in the past 2 weeks patient has been more anxious at home, pacing around at home early in the morning from 3 am onwards. She states that a few weeks ago, patient had an appointment with his psychiatrist Dr. Chavez, who was increasing one of his medications each week. Dr. Chavez advised that if his medications were not effective, then they might need to consider ECT as a treatment option. Ms. Rondon does not recall which medication was being increased. Ms. Rondon states that patient became very anxious about the procedure and elicited suicidal ideation that that if the medications were no longer working that he would hurt himself. Ms. Rondon states she was prompted to bring him to the hospital after his statement. She states that in the past few weeks, patient increasingly complained of being lonely and isolated. She states that he has become very picky with his food, but eats the same quantity of food that is usual for him. Regarding medication compliance, Ms. Rondon states that patient is compliant with medications and takes them at scheduled times during the day. She also states that he sleeps regularly at the same time.   Ms. Rondon states that in the past, the patient’s anxiety appeared ever since he took the Pfizer COVID vaccine. 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 After which, there was significant financial stress and his wife also became unemployed at the time. She states that patient became withdrawn and he did not want his family or friends to find out about his unemployment because he was ashamed. She also states that due to the pandemic, the patient was more isolated and would complain of being lonely often. Ms. Rondon states that in July 2021 patient was hospitalized after an incident of self harm. She states that after he was discharged, he was doing well at home, they would go on frequent trips and he “seemed like himself” again. She states that at his baseline, he was confident, outgoing, smart and funny. Ms. 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 She states that around February 2022, patient was diagnosed with cataracts and he became anxious that he would lose his vision because his late mother had glaucoma. Ms. Rondon states that after he knew he had cataracts, he was unable to keep his job as a  due to his anxiety. However, she states that he has no problems with his vision. She states that his anxiety has returned since then and became increasing after his recent appointment with his psychiatrist, who suggested a possibility of ECT if the medications were not effective.

## 2022-10-05 NOTE — BH INPATIENT PSYCHIATRY PROGRESS NOTE - NSBHATTESTTYPEVISIT_PSY_A_CORE
[facility] Division of Hospital Medicine  Thaddeus Zelaya MD, LAY  Pager (M-F, 8A-[phone]): [phone]  Other Times:  [phone]    Patient is a 67y old  Male who presents with a chief complaint of lethargy, coughing, SOB (24 Jan 2020 15:31)      SUBJECTIVE / OVERNIGHT EVENTS:  No events overnight. Still dyspneic, but states he's feeling better a little since admission.     MEDICATIONS  (STANDING):  aMIOdarone    Tablet 200 milliGRAM(s) Oral daily  aspirin enteric coated 81 milliGRAM(s) Oral daily  calcium acetate 667 milliGRAM(s) Oral three times a day with meals  fluticasone propionate 50 MICROgram(s)/spray Nasal Spray 1 Spray(s) Both Nostrils two times a day  insulin glargine Injectable (LANTUS) 8 Unit(s) SubCutaneous at bedtime  insulin lispro (HumaLOG) corrective regimen sliding scale   SubCutaneous three times a day before meals  insulin lispro (HumaLOG) corrective regimen sliding scale   SubCutaneous at bedtime  insulin lispro Injectable (HumaLOG) 5 Unit(s) SubCutaneous three times a day before meals  ketotifen 0.025% Ophthalmic Solution 1 Drop(s) Both EYES daily  methimazole 5 milliGRAM(s) Oral daily  metoprolol succinate  milliGRAM(s) Oral daily  pantoprazole    Tablet 40 milliGRAM(s) Oral before breakfast  simvastatin 40 milliGRAM(s) Oral at bedtime  sodium chloride 0.65% Nasal 1 Spray(s) Both Nostrils two times a day  tiotropium 18 MICROgram(s) Capsule 1 Capsule(s) Inhalation daily    MEDICATIONS  (PRN):  acetaminophen   Tablet .. 650 milliGRAM(s) Oral every 6 hours PRN Temp greater or equal to 38C (100.4F), Mild Pain (1 - 3)  albuterol/ipratropium for Nebulization. 3 milliLiter(s) Nebulizer every 6 hours PRN Shortness of Breath and/or Wheezing  dextrose 40% Gel 15 Gram(s) Oral once PRN Blood Glucose LESS THAN 70 milliGRAM(s)/deciliter  glucagon  Injectable 1 milliGRAM(s) IntraMuscular once PRN Glucose LESS THAN 70 milligrams/deciliter  guaiFENesin   Syrup  (Sugar-Free) 100 milliGRAM(s) Oral every 6 hours PRN Cough    CAPILLARY BLOOD GLUCOSE  POCT Blood Glucose.: 146 mg/dL (24 Jan 2020 13:53)  POCT Blood Glucose.: 130 mg/dL (24 Jan 2020 09:53)  POCT Blood Glucose.: 153 mg/dL (23 Jan 2020 21:47)    I&O's Summary    23 Jan 2020 07:01  -  24 Jan 2020 07:00  --------------------------------------------------------  IN: 720 mL / OUT: 2550 mL / NET: -1830 mL        PHYSICAL EXAM:  Vital Signs Last 24 Hrs  T(C): 36.7 (24 Jan 2020 17:28), Max: 37.7 (23 Jan 2020 20:48)  T(F): 98.1 (24 Jan 2020 17:28), Max: 99.8 (23 Jan 2020 20:48)  HR: 78 (24 Jan 2020 17:28) (78 - 89)  BP: 116/70 (24 Jan 2020 17:28) (103/58 - 124/58)  BP(mean): --  RR: 18 (24 Jan 2020 17:28) (18 - 18)  SpO2: 95% (24 Jan 2020 17:28) (95% - 100%)    GENERAL: NAD, well-developed  HEAD:  Atraumatic, Normocephalic  EYES:  conjunctiva and sclera clear  NECK: Supple, +JVD  CHEST/LUNG: Clear to auscultation bilaterally; No wheeze  HEART: Regular rate and rhythm; No murmurs, rubs, or gallops  ABDOMEN: Soft, Nontender, Nondistended; Bowel sounds present  EXTREMITIES:  No clubbing, cyanosis, or edema  PSYCH: AAOx3      LABS:                        13.1   9.94  )-----------( 145      ( 24 Jan 2020 08:08 )             44.1     01-24    131<L>  |  91<L>  |  32<H>  ----------------------------<  131<H>  4.3   |  25  |  4.92<H>    Ca    9.1      24 Jan 2020 07:18      PT/INR - ( 24 Jan 2020 08:08 )   PT: 30.3 sec;   INR: 2.56 ratio    PTT - ( 24 Jan 2020 08:08 )  PTT:>200.0 sec      RADIOLOGY & ADDITIONAL TESTS:    Lab Results Reviewed: INR increasing sharply Attending with Resident/Fellow/Student Attending Only

## 2022-10-05 NOTE — BH INPATIENT PSYCHIATRY PROGRESS NOTE - NSBHASSESSSUMMFT_PSY_ALL_CORE
2 y/o  male, unemployed, currently domiciled with wife in [place], with PMHx of HTN, BPH, with Past psych Hx significant for a previous suicide attempt by cutting wrist in July 2021, 2 previous psych admissions at [facility] and [facility], admitted to inpatient geriatric unit after worsening anxiety prompting wife to make hotline call over the weekend that she could not take care of the patient anymore.    Patient was maintained on Remeron 30 mg qhs, Seroquel 25 mg TID, and Klonopin 0.25 q AM & q afternoon. Patient's mood and mental status was maintained well until 1 month ago when his daughter decided to move out and patient felt more financial burden. 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     At Geriatric clinic, patient signed voluntary papers but only after much prompting from his wife.     Clinical update 10/04:   Patient continues to have anxiety and apprehension. But his affect is less anxious. Hemorrhoids are reportedly shrunken with no bleeding per rectum any longer for over 5 days. Patient reports regular bowel movements. Patient was maintained on Klonopin 0.25 mg TID PRN for anxiety. Patient's Klonopin will be reduced to Klonopin 0.25 mg q 6 AM & 0.25 mg qd PRN for anxiety.     Plan:  1. Legal status: Voluntary Admission to the Inpatient Geriatric Unit (9:13)  2. Inpatient therapeutic milieu  3. Continue with Psychotropic Home medications:  Remeron 30 mg qhs, Seroquel 25 mg TID   Continue with Klonopin 0.25 mg TID PRN for anxiety   For Anxiety:   Will increase Venlafaxine today (10/4/22) to 112.5 mg qd  Will Start Klonopin 0.25mg at 6AM  Will reduce PRN Klonopin to 0.25mg q daily PRN for anxiety    4. Medical management:    HTN:      Will Continue with Norvasc 7.5 mg qd.      Will continue with Lisinopril 20 mg qd      Will Continue with DASH Diet     HLD:     Will Continue with Atorvastatin 10 mg qhs    Hemorrhoids:    Will DC Preparation H cream application     Will DC Senna qhs  72-year-old  male, unemployed, currently domiciled with wife in [place], with PMHx of HTN, BPH, with Past psych Hx significant for a previous suicide attempt by cutting wrist in July 2021, 2 previous psych admissions at [facility] and [facility], admitted to inpatient geriatric unit after worsening anxiety prompting wife to make hotline call over the weekend that she could not take care of the patient anymore.    Patient was maintained on Remeron 30 mg qhs, Seroquel 25 mg TID, and Klonopin 0.25 q AM & q afternoon. Patient's mood and mental status was maintained well until 1 month ago when his daughter decided to move out and patient felt more financial burden. 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     At Geriatric clinic, patient signed voluntary papers but only after much prompting from his wife.     10/05 Clinical update   Patient continues to report anxiety in context of his finances but is to verbalize that some of his concerns are irrational. He does appear less anxious and is better engaged during interview. He denies any SI/HI and is looking forward to going home later this week.    Plan:  -Will continue home meds--mirtazapine 30mg qhs and quetiapine 25mg TID  -Will increase Venlafaxine to 150mg qd  -Will continue Klonopin 0.25mg at 6AM as well as PRN Klonopin at 0.25mg q daily PRN for anxiety  #Medical management:    HTN:      Will Continue with Norvasc 7.5 mg qd.      Will continue with Lisinopril 20 mg qd      Will Continue with DASH Diet     HLD:     Will Continue with Atorvastatin 10 mg qhs    Hemorrhoids:    Will DC Preparation H cream application     Will DC Senna qhs

## 2022-10-05 NOTE — BH TREATMENT PLAN - NSTXANXGOAL_PSY_ALL_CORE
Be able to participate in activities despite lingering anxiety/panic
Identify and practice 3 coping skills to manage anxiety
Be able to participate in activities despite lingering anxiety/panic

## 2022-10-05 NOTE — BH INPATIENT PSYCHIATRY PROGRESS NOTE - CURRENT MEDICATION
MEDICATIONS  (STANDING):  amLODIPine   Tablet 7.5 milliGRAM(s) Oral daily  atorvastatin 10 milliGRAM(s) Oral at bedtime  clonazePAM  Tablet 0.25 milliGRAM(s) Oral <User Schedule>  lisinopril 20 milliGRAM(s) Oral daily  mirtazapine 30 milliGRAM(s) Oral at bedtime  QUEtiapine 25 milliGRAM(s) Oral three times a day  senna 2 Tablet(s) Oral at bedtime  venlafaxine .5 milliGRAM(s) Oral daily    MEDICATIONS  (PRN):  clonazePAM  Tablet 0.25 milliGRAM(s) Oral daily PRN Anxiety   MEDICATIONS  (STANDING):  amLODIPine   Tablet 7.5 milliGRAM(s) Oral daily  atorvastatin 10 milliGRAM(s) Oral at bedtime  clonazePAM  Tablet 0.25 milliGRAM(s) Oral <User Schedule>  lisinopril 20 milliGRAM(s) Oral daily  mirtazapine 30 milliGRAM(s) Oral at bedtime  QUEtiapine 25 milliGRAM(s) Oral three times a day  senna 2 Tablet(s) Oral at bedtime  venlafaxine  milliGRAM(s) Oral daily    MEDICATIONS  (PRN):  clonazePAM  Tablet 0.25 milliGRAM(s) Oral daily PRN Anxiety

## 2022-10-05 NOTE — BH INPATIENT PSYCHIATRY DISCHARGE NOTE - NSBHDCMEDICALFT_PSY_A_CORE
HTN:   Norvasc 2.5 mg oral tablet 3 tabs once a day   Lisinopril 20 mg 1 tab once a day      HLD:  Lovastatin 20 mg  1 tab once a day      Hemorrhoids:   Preparation- H cream three times /day (for 1 week) - Completed (No need for further continuation)     Constipation:  Senna leaf extract 2  tablet once a day (at bedtime)

## 2022-10-05 NOTE — BH INPATIENT PSYCHIATRY DISCHARGE NOTE - OTHER PAST PSYCHIATRIC HISTORY (INCLUDE DETAILS REGARDING ONSET, COURSE OF ILLNESS, INPATIENT/OUTPATIENT TREATMENT)
Pt has one previous S/A via cutting wrists in 2021 and was hospitalized at [facility].  Prior to that he was hospitalized at [facility] for depression and anxiety.  As per wife and pt, "all of this started with COVID." Pt attended the [facility] post discharge for [facility]. MDD with previous suicidal attempt in July 2021  1 day admission in [facility] just before the Suicide attempt (one week before suicide attempt)    Psych admission in [facility] after attempting suicide.

## 2022-10-05 NOTE — BH INPATIENT PSYCHIATRY DISCHARGE NOTE - NSBHDCHANDOFFFT_PSY_ALL_CORE
hospital course and management Verbal handoff provided to outpatient psychiatrist Dr. Chavez discussing patient's hospital course and management

## 2022-10-05 NOTE — BH INPATIENT PSYCHIATRY DISCHARGE NOTE - HOSPITAL COURSE
Patient's home medications were continued as follows: Remeron 30 mg qhs, and Seroquel 25 mg TID. Klonopin was increased from 0.25 q AM & q afternoon to 0.25 mg TID PRN for anxiety. On the day of admission, patient had an episode of bleeding per rectum. On examination, patient had external hemorrhoids which were the source of the bleeding. Patient reported having some sort of constipation and he has to strain. Patient was prescribed Preparation-H cream with Senna 8.6 (2 tab) at night for the relative constipation, the patient is having. Next day, patient had another episode of bleeding which was milder and amount of blood was smaller. Both episodes were self limited. Patient did not report any episodes of bleeding anymore. Patient initially had a problem adjusting to the hospital stay. However, by the time patient started to get more adjusted to hospital stay. Patient was started on Venlafaxine 37.5 with gradual upgrading of the dose upon tolerability. Venlafaxine' s dose was titrated up to 150 mg, with good tolerability by the patient. Patient became less anxious about being in the hospital. The residual anxiety the patient is currently having is being anxious about how he can deal with his financial problems. Patient had multiple conversations with his wife whop assured him that she is working and she is handling the financial situation. Initially, patient was catastrophizing the situation by overt thinking about his wife's car parking and if it might be a problem hindering her ability to go to work and might be fired. However, over his hospital stay, patient met with psychologists, who helped the patient to alleviate his cognitive distortions and be able to be more realistically oriented. Klonopin was reduced from 0.25 mg TID PRN to Klonopin 0.25 mg qd standing, with one extra /day dose PRN. Patient on 10/7/22, reached maximal benefit from stay at the hospital and was discharged home with this current regimen:   Effexor 150 mg ER capsule once a day  Remeron 30 mg  1 tab once a day (at bedtime)    Seroquel 25 mg 1 tab 3 times a day   Norvasc 2.5 mg oral tablet 3 tabs once a day    Lisinopril 20 mg 1 tab once a day    Lovastatin 20 mg  1 tab once a day    Klonopin 0.5 mg once a day    Klonopin 0.5 mg once a day, As Needed    Senna leaf extract 2  tablet once a day (at bedtime)         Patient's home medications were continued as follows: Remeron 30 mg qhs, and Seroquel 25 mg TID. Klonopin was increased from 0.25 q AM & q afternoon to 0.25 mg TID PRN for anxiety. On the day of admission, patient had an episode of bleeding per rectum. On examination, patient had external hemorrhoids which were the source of the bleeding. Patient reported having some sort of constipation and he has to strain. Patient was prescribed Preparation-H cream with Senna 8.6 (2 tab) at night for the relative constipation, the patient is having. Next day, patient had another episode of bleeding which was milder and amount of blood was smaller. Both episodes were self limited. Patient did not report any episodes of bleeding anymore. Patient initially had a problem adjusting to the hospital stay. However, by the time patient started to get more adjusted to hospital stay. Patient was started on Venlafaxine 37.5 with gradual upgrading of the dose upon tolerability. Venlafaxine' s dose was titrated up to 150 mg, with good tolerability by the patient. Patient became less anxious about being in the hospital. Patient's blood pressure was trending high and needed an increment of Norvasc dose from 5 mg to 7.5 mg qd. The residual anxiety the patient is currently having is being anxious about how he can deal with his financial problems. Patient had multiple conversations with his wife whop assured him that she is working and she is handling the financial situation. Initially, patient was catastrophizing the situation by overt thinking about his wife's car parking and if it might be a problem hindering her ability to go to work and might be fired. However, over his hospital stay, patient met with psychologists, who helped the patient to alleviate his cognitive distortions and be able to be more realistically oriented. Klonopin was reduced from 0.25 mg TID PRN to Klonopin 0.25 mg qd standing, with one extra /day dose PRN. Patient on 10/7/22, reached maximal benefit from stay at the hospital and was discharged home with this current regimen:   Effexor 150 mg ER capsule once a day  Remeron 30 mg  1 tab once a day (at bedtime)    Seroquel 25 mg 1 tab 3 times a day   Norvasc 2.5 mg oral tablet 3 tabs once a day    Lisinopril 20 mg 1 tab once a day    Lovastatin 20 mg  1 tab once a day    Klonopin 0.5 mg once a day    Klonopin 0.5 mg once a day, As Needed    Senna leaf extract 2  tablet once a day (at bedtime)         Patient's home medications were continued; Remeron 30 mg qhs, and Seroquel 25 mg TID. Klonopin was increased from 0.25 q AM & q afternoon to 0.25 mg TID PRN for anxiety. On the day of admission, patient had an episode of bleeding per rectum. On examination, patient had external hemorrhoids which were the source of the bleeding. Patient reported having some sort of constipation and he has to strain. Patient was prescribed Preparation-H cream with Senna 8.6 (2 tab) at night for the relative constipation, the patient is having. Next day, patient had another episode of bleeding which was milder and amount of blood was smaller. Both episodes were self limited. Patient did not report any episodes of bleeding anymore. Patient initially had a problem adjusting to the hospital stay. However, by the time patient started to get more adjusted to hospital stay. Patient was started on Venlafaxine 37.5 with gradual upgrading of the dose upon tolerability. Venlafaxine' s dose was titrated up to 150 mg, with good tolerability by the patient. Patient became less anxious about being in the hospital. Patient's blood pressure was trending high and needed an increment of Norvasc dose from 5 mg to 7.5 mg qd. The residual anxiety the patient is currently having is being anxious about how he can deal with his financial problems. Patient had multiple conversations with his wife whop assured him that she is working and she is handling the financial situation. Initially, patient was catastrophizing the situation by overt thinking about his wife's car parking and if it might be a problem hindering her ability to go to work and might be fired. However, over his hospital stay, patient met with psychologists, who helped the patient to alleviate his cognitive distortions and be able to be more realistically oriented. Klonopin was reduced from 0.25 mg TID PRN to Klonopin 0.25 mg qd standing, with one extra /day dose PRN. Patient on 10/7/22, reached maximal benefit from stay at the hospital and was discharged home with this current regimen:   Effexor 150 mg ER capsule once a day  Remeron 30 mg  1 tab once a day (at bedtime)    Seroquel 25 mg 1 tab 3 times a day   Norvasc 2.5 mg oral tablet 3 tabs once a day    Lisinopril 20 mg 1 tab once a day    Lovastatin 20 mg  1 tab once a day    Klonopin 0.5 mg once a day    Klonopin 0.5 mg once a day, As Needed    Senna leaf extract 2  tablet once a day (at bedtime)         Patient's home medications were continued; Remeron 30 mg qhs, and Seroquel 25 mg TID. Klonopin was increased from 0.25 q AM & q afternoon to 0.25 mg TID PRN for anxiety. PRN. Patient's blood pressure was trending high during hospital stay and needed an increment of Norvasc dose from 5 mg to 7.5 mg qd. His blood pressure medications were continued otherwise. On the day of admission, patient had an episode of bleeding per rectum. On examination, patient had external hemorrhoids which were the source of the bleeding. Patient reported having some sort of constipation and he has to strain. Patient was prescribed Preparation-H cream with Senna 8.6 (2 tab) at night for the relative constipation, the patient is having. Next day, patient had another episode of bleeding which was milder and amount of blood was smaller. Both episodes were self limited. Patient did not report any episodes of bleeding anymore. Patient initially had a problem adjusting to the hospital stay. He felt anxious about being in the hospital, and how he can spend his time inside the hospital. Patient was started on Venlafaxine 37.5 with gradual upgrading of the dose upon tolerability. Venlafaxine' s dose was titrated up to 150 mg, and was well tolerated by the patient. Patient became less anxious about being in the hospital. The residual anxiety the patient is currently having is about how he can deal with his financial problems. Patient had multiple conversations with his wife who assured him that she is working and she is handling the financial situation. Initially, patient was catastrophizing the situation by overt thinking about his wife's car parking and if it might be a problem hindering her ability to go to work and she might be fired. However, over his hospital stay, patient's level of anxiety went down and the patient met with psychologists, who helped him to alleviate his cognitive distortions and be able to be more realistically oriented. Over the last few days before discharge, Klonopin was reduced from 0.25 mg TID PRN to Klonopin 0.25 mg qd standing, with a PRN Klonopin 0.25 mg /day. Patient on 10/7/22, reached maximal benefit from stay at the hospital and was discharged home with this current regimen:   Effexor 150 mg ER capsule once a day  Remeron 30 mg  1 tab once a day (at bedtime)    Seroquel 25 mg 1 tab 3 times a day   Norvasc 2.5 mg oral tablet 3 tabs once a day    Lisinopril 20 mg 1 tab once a day    Lovastatin 20 mg  1 tab once a day    Klonopin 0.5 mg once a day    Klonopin 0.5 mg once a day, As Needed    Senna leaf extract 2  tablet once a day (at bedtime) Patient's home medications were continued; Remeron 30 mg qhs, and Seroquel 25 mg TID. Klonopin was increased from 0.25 q AM & q afternoon to 0.25 mg TID PRN for anxiety. Patient's blood pressure was trending high during hospital stay and needed an increment of Norvasc dose from 5 mg to 7.5 mg qd. His blood pressure medications were continued otherwise. On the day of admission, patient had an episode of bleeding per rectum. On examination, patient had external hemorrhoids which were the source of the bleeding. Patient reported having some sort of constipation and he has to strain. Patient was prescribed Preparation-H cream with Senna 8.6 (2 tab) at night for the relative constipation, the patient is having. Next day, patient had another episode of bleeding which was milder and amount of blood was smaller. Both episodes were self limited. Patient did not report any episodes of bleeding anymore. Patient initially had a problem adjusting to the hospital stay. He felt anxious about being in the hospital, and how he can spend his time inside the hospital. Patient was started on Venlafaxine 37.5 with gradual upgrading of the dose upon tolerability. Venlafaxine' s dose was titrated up to 150 mg, and was well tolerated by the patient. Patient became less anxious about being in the hospital. The residual anxiety the patient is currently having is about how he can deal with his financial problems. Patient had multiple conversations with his wife while he was in the unit. She assured him that she is currently working and she is handling the financial situation. Initially, patient was catastrophizing the situation by overt thinking about his wife's car parking and if it might be a problem hindering her ability to go to work and she might be fired. However, over his hospital stay, patient's level of anxiety went down and the patient met with psychologists, who helped him to alleviate his cognitive distortions and be able to be more realistically oriented. Over the last few days before discharge, Klonopin was reduced from 0.25 mg TID PRN to Klonopin 0.25 mg qd standing, with a PRN Klonopin 0.25 mg /day. Patient on 10/7/22, reached maximal benefit from stay at the hospital and was discharged home with this current regimen:   Effexor 150 mg ER capsule once a day  Remeron 30 mg  1 tab once a day (at bedtime)    Seroquel 25 mg 1 tab 3 times a day   Norvasc 2.5 mg oral tablet 3 tabs once a day    Lisinopril 20 mg 1 tab once a day    Lovastatin 20 mg  1 tab once a day    Klonopin 0.5 mg once a day    Klonopin 0.5 mg once a day, As Needed    Senna leaf extract 2  tablet once a day (at bedtime) On evaluation at geriatric psych inpatient unit, patient was markedly anxious pacing around the unit, talking loudly to the nursing staff, trying to reach his admission papers to rip them off as he stated. Patient was then interviewed by the attending psychiatrist and the resident physician. Patient was asked to sit down and relax. He sat down for just one second, but he could not sit still. He suddenly stood up and started wandering in the room where the interview was conducted. Patient was apprehensive about his finances especially after he lost his job and his daughter decided to move out as she will no longer be financially participating in payment for the newly acquired apartment. Patient explained his wandering while having interview that he has claustrophobia, and he feels that the place is relatively small and evokes this phobia. Patient endorsed having panic attacks, but when screened for symptoms of panic including palpitation or perioral numbness or feeling lightheaded, he dined all these symptoms. Patient expressed depressed mood comorbid with anxiety. He described his sleep as ok except for the last 2 weeks when he started to wake up earlier around 4 AM and not able to go back to sleep. He attributed worsening of mood and anxiety after his daughter moved out 1 months ago. But got markedly worse 2 weeks ago.  Patient's home medications were continued-- mirtazapine 30 mg qhs, and quetiapine 25 mg TID. Home clonazepam was however decreased to 0.25mg BID. Patient's blood pressure was trending high during hospital stay and needed an increment of Norvasc dose from 5mg to 7.5mg daily. His blood pressure medications were continued otherwise. On the day of admission, patient had an episode of bleeding per rectum. On examination, patient had external hemorrhoids which were the source of the bleeding. Patient was prescribed Preparation-H cream with Senna 8.6 (2 tab) at night for the relative constipation, the patient is having. Next day, patient had another episode of bleeding which was milder, and amount of blood was smaller. Both episodes were self-limited. Patient did not report any further episodes of bleeding. Patient initially had a problem adjusting to the hospital stay. He felt anxious about being in the hospital, and how he would spend his time inside the hospital. Patient was started on Venlafaxine 37.5mg to address his anxiety symptoms with gradual upgrading of the dose upon tolerability. Venlafaxine' s dose was titrated up to 150 mg and was well tolerated by the patient. Patient became less anxious about being in the hospital. He also participated in group and individual therapy sessions to acquire some coping skills. He however continues to have residual anxiety in context of his finances, which he does recognize as “irrational” and recognizes his cognitive distortions and is trying to be more realistically oriented. Over the last few days before discharge, clonazepam was changed from 0.25mg BID PRN to 0.25 mg daily, with a PRN clonazepam 0.25mg /day. Patient on 10/7/22, reached maximal benefit from stay at the hospital and was discharged home. Patient's case was discussed with all members of interdisciplinary team and when everyone agreed that patient was stable and appropriate for discharge, he was discharged home. Suicide risk assessment was performed on the day of discharge. Patient denied all suicidal ideation, intent, and plan, and, in view of demonstrated clinical improvement, is not judged to be an acute danger to self or others at this time. Prior to discharge, patient was provided with a 30-day supply of venlafaxine and the importance of treatment compliance was reiterated. Patient verbalized understanding of the matter and agreed with his discharge plans. Patient was instructed on actions for crisis situations, understood and agreed to follow instructions for handling crisis, including coming to ER or calling 911.  Psychotropic medications on discharge: clonazepam 0.25 mg daily and clonazepam 0.25mg daily PRN, venlafaxine ER 150mg daily, mirtazapine 30mg daily, and quetiapine 25mg TID

## 2022-10-05 NOTE — BH TREATMENT PLAN - NSDCCRITERIA_PSY_ALL_CORE
Patient is able to control his anxiety, distressing thoughts and apprehensive behavior. 

## 2022-10-05 NOTE — BH TREATMENT PLAN - NSTXDCOTHRINTERSW_PSY_ALL_CORE
[facility] coordinating dc . return to [facility].  appointment in place for aftercare. wife will  on tentative dc date 10/7
SW will provide support and encouragement for compliance with treatment and discharge planning.

## 2022-10-05 NOTE — BH INPATIENT PSYCHIATRY PROGRESS NOTE - MSE UNSTRUCTURED FT
Older male, looks younger than his stated age. Cooperative on encounter with fair eye contact.  Patient is still exhibiting psychomotor hyperactivity with observable wandering around the unit. No abnormal movements were noted. Steady gait observed. Depressed mood with anxious distress is observed. Still has some affect restrictions. Monotonous speech is noted. Thought process is linear & goal directed. Thought content notable for catastrophic thinking with secondary anticipatory apprehension. Patient denies any current Suicidal ideations either active or passive. Patient denies any perceptual abnormalities. Insight is intact with fair judgement. Impulse control intact at the time of exam. Oriented X3.  Patient appears stated age with adequate grooming and hygiene. Cooperative on encounter with fair eye contact. Patient is still exhibiting psychomotor hyperactivity. No abnormal movements were noted. Steady gait observed. Reported mood "anxious" with anxious distress is observed. Still has some affect restrictions. Monotonous speech is noted. Thought process is linear & goal directed. Thought content notable for catastrophic thinking with secondary anticipatory apprehension. Patient denies any current Suicidal ideations either active or passive. Patient denies any perceptual abnormalities. Insight is intact with fair judgement. Impulse control intact at the time of exam. Oriented X3.

## 2022-10-05 NOTE — BH TREATMENT PLAN - NSTXHYPERINTERMD_PSY_ALL_CORE
Keep patient's baseline anxiety at the lowest level which would contribute to keep blood pressure controlled with antihypertensive meds.

## 2022-10-05 NOTE — BH CHART NOTE - NSPSYPRGNOTEFT_PSY_ALL_CORE
Skipr met with Mr. Mccollum for 20 minutes on ML5 to introduce herself. Skipr is a presently scheduled to be Mr. Mccollum's outpatient therapist in the [facility] program following hospital discharge, as his previous therapist has completed her fellowship year, and this writer has begun her fellowship year. Mr. Mccollum was receptive to meeting and was engaged in the discussion with this writer. He presented with an anxious mood and paced throughout the session due to his symptoms of anxiety. He shared that his anxiety causes him to have recurrent worries about his daily life. Writer discussed considering other possible perspectives for these thoughts with Mr. Mccollum. Writer also reinforced healthy coping skills, including coaching Mr. Mccollum through a breathing exercise, and the use of distraction techniques (Mr. Mccollum reported that a recent game of chess and a painting group provided very brief reduction in his symptoms this week). Writer encouraged medication and treatment compliance as well as group attendance while on the unit, and Mr. Mccollum was receptive. Mr. Mccollum denied suicidal intentions and/or plans.     Discussed with members of Mr. Mccollum's inpatient treatment team.

## 2022-10-05 NOTE — BH INPATIENT PSYCHIATRY PROGRESS NOTE - NSBHFUPINTERVALHXFT_PSY_A_CORE
Patient seen for follow-up for worsening and depression. Chart reviewed and case discussed with interdisciplinary team. No acute events overnight. On exam, patient continues to appear anxious, but is pacing less and is able to sit through the entire interview. Patient reported that he enjoyed playing chess with one of the nurses over the weekend. This morning he felt mild surge of anxiety regarding staying at the hospital with his major concern about financial issues. Patient reported having a bowel movement yesterday.  Patient seen for follow-up for worsening and depression. Chart reviewed and case discussed with interdisciplinary team. No acute events overnight. On exam, patient continues to appear anxious, but is pacing less and is able to sit through the entire interview. Stress reduction techniques discussed with patient but he was somewhat distracted. Patient denies any thought of self-harm and is looking forward to going home on Friday. His appetite and sleep are adequate. His vitals are stable.

## 2022-10-06 PROCEDURE — 99231 SBSQ HOSP IP/OBS SF/LOW 25: CPT

## 2022-10-06 RX ADMIN — AMLODIPINE BESYLATE 7.5 MILLIGRAM(S): 2.5 TABLET ORAL at 09:09

## 2022-10-06 RX ADMIN — Medication 0.25 MILLIGRAM(S): at 08:31

## 2022-10-06 RX ADMIN — QUETIAPINE FUMARATE 25 MILLIGRAM(S): 200 TABLET, FILM COATED ORAL at 20:54

## 2022-10-06 RX ADMIN — MIRTAZAPINE 30 MILLIGRAM(S): 45 TABLET, ORALLY DISINTEGRATING ORAL at 20:55

## 2022-10-06 RX ADMIN — ATORVASTATIN CALCIUM 10 MILLIGRAM(S): 80 TABLET, FILM COATED ORAL at 20:55

## 2022-10-06 RX ADMIN — Medication 150 MILLIGRAM(S): at 09:09

## 2022-10-06 RX ADMIN — LISINOPRIL 20 MILLIGRAM(S): 2.5 TABLET ORAL at 09:08

## 2022-10-06 RX ADMIN — QUETIAPINE FUMARATE 25 MILLIGRAM(S): 200 TABLET, FILM COATED ORAL at 09:08

## 2022-10-06 RX ADMIN — SENNA PLUS 2 TABLET(S): 8.6 TABLET ORAL at 20:54

## 2022-10-06 RX ADMIN — Medication 0.25 MILLIGRAM(S): at 17:46

## 2022-10-06 RX ADMIN — QUETIAPINE FUMARATE 25 MILLIGRAM(S): 200 TABLET, FILM COATED ORAL at 12:56

## 2022-10-06 NOTE — BH INPATIENT PSYCHIATRY PROGRESS NOTE - CURRENT MEDICATION
MEDICATIONS  (STANDING):  amLODIPine   Tablet 7.5 milliGRAM(s) Oral daily  atorvastatin 10 milliGRAM(s) Oral at bedtime  clonazePAM  Tablet 0.25 milliGRAM(s) Oral <User Schedule>  lisinopril 20 milliGRAM(s) Oral daily  mirtazapine 30 milliGRAM(s) Oral at bedtime  QUEtiapine 25 milliGRAM(s) Oral three times a day  senna 2 Tablet(s) Oral at bedtime  venlafaxine  milliGRAM(s) Oral daily    MEDICATIONS  (PRN):  clonazePAM  Tablet 0.25 milliGRAM(s) Oral daily PRN Anxiety

## 2022-10-06 NOTE — BH INPATIENT PSYCHIATRY PROGRESS NOTE - NSBHFUPINTERVALHXFT_PSY_A_CORE
Patient seen for follow-up for worsening anxiety and depression. Chart reviewed and case discussed with interdisciplinary team. No acute events overnight. On exam, patient is solving some puzzles, appears anxious but is not pacing today and is able to sit through the entire interview. Patient inquires about his discharge plan tomorrow and is informed that everything is in place. Patient denies any thought of self-harm and is looking forward to going home. His appetite and sleep are adequate. His vitals are stable.

## 2022-10-06 NOTE — BH INPATIENT PSYCHIATRY PROGRESS NOTE - MSE UNSTRUCTURED FT
Patient appears stated age with adequate grooming and hygiene. Cooperative on encounter with fair eye contact. Patient is still exhibiting psychomotor hyperactivity. No abnormal movements were noted. Steady gait observed. Reported mood "maybe a little better" with anxious affect although with restricted range. Monotonous speech is noted. Thought process is linear & goal directed. Thought content notable for catastrophic thinking with secondary anticipatory apprehension. Patient denies any current suicidal ideations either active or passive. Patient denies any perceptual abnormalities. Insight is intact with fair judgement. Impulse control intact at the time of exam. Oriented X3.

## 2022-10-06 NOTE — BH INPATIENT PSYCHIATRY PROGRESS NOTE - NSBHASSESSSUMMFT_PSY_ALL_CORE
72-year-old  male, unemployed, currently domiciled with wife in [place], with PMHx of HTN, BPH, with Past psych Hx significant for a previous suicide attempt by cutting wrist in July 2021, 2 previous psych admissions at [facility] and [facility], admitted to inpatient geriatric unit after worsening anxiety prompting wife to make hotline call over the weekend that she could not take care of the patient anymore.    Patient was maintained on Remeron 30 mg qhs, Seroquel 25 mg TID, and Klonopin 0.25 q AM & q afternoon. Patient's mood and mental status was maintained well until 1 month ago when his daughter decided to move out and patient felt more financial burden. 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     At Geriatric clinic, patient signed voluntary papers but only after much prompting from his wife.     10/06 Clinical update   Patient with some reported improvement in anxiety today. He also appear less anxious and is better engaged during interviews. He denies any SI/HI and is looking forward to going home tomorrow.    Plan:  -Will continue home meds--mirtazapine 30mg qhs and quetiapine 25mg TID  -Will continue Venlafaxine XR 150mg qd  -Will continue Klonopin 0.25mg at 6AM as well as PRN Klonopin at 0.25mg q daily PRN for anxiety  #Medical management:    HTN:      Will Continue with Norvasc 7.5 mg qd.      Will continue with Lisinopril 20 mg qd      Will Continue with DASH Diet     HLD:     Will Continue with Atorvastatin 10 mg qhs    Hemorrhoids:    Will DC Preparation H cream application     Will continue bowel regimen

## 2022-10-06 NOTE — BH INPATIENT PSYCHIATRY PROGRESS NOTE - NSBHCHARTREVIEWVS_PSY_A_CORE FT
Vital Signs Last 24 Hrs  T(C): 36.1 (10-06-22 @ 08:42), Max: 36.8 (10-05-22 @ 15:37)  T(F): 97 (10-06-22 @ 08:42), Max: 98.2 (10-05-22 @ 15:37)  RR: 18 (10-06-22 @ 08:42) (18 - 18)  SpO2: 98% (10-06-22 @ 08:42) (98% - 98%)    Orthostatic VS  10-06-22 @ 08:42  Sitting BP: 153/73 HR: 64  Standing BP: 150/74 HR: 84

## 2022-10-07 VITALS — TEMPERATURE: 100 F

## 2022-10-07 LAB
A1C WITH ESTIMATED AVERAGE GLUCOSE RESULT: 5.3 % — SIGNIFICANT CHANGE UP (ref 4–5.6)
ALBUMIN SERPL ELPH-MCNC: 4.4 G/DL — SIGNIFICANT CHANGE UP (ref 3.3–5)
ALP SERPL-CCNC: 66 U/L — SIGNIFICANT CHANGE UP (ref 40–120)
ALT FLD-CCNC: 23 U/L — SIGNIFICANT CHANGE UP (ref 4–41)
ANION GAP SERPL CALC-SCNC: 10 MMOL/L — SIGNIFICANT CHANGE UP (ref 7–14)
AST SERPL-CCNC: 23 U/L — SIGNIFICANT CHANGE UP (ref 4–40)
BILIRUB SERPL-MCNC: 0.4 MG/DL — SIGNIFICANT CHANGE UP (ref 0.2–1.2)
BUN SERPL-MCNC: 25 MG/DL — HIGH (ref 7–23)
CALCIUM SERPL-MCNC: 9 MG/DL — SIGNIFICANT CHANGE UP (ref 8.4–10.5)
CHLORIDE SERPL-SCNC: 104 MMOL/L — SIGNIFICANT CHANGE UP (ref 98–107)
CHOLEST SERPL-MCNC: 159 MG/DL — SIGNIFICANT CHANGE UP
CO2 SERPL-SCNC: 25 MMOL/L — SIGNIFICANT CHANGE UP (ref 22–31)
CREAT SERPL-MCNC: 0.98 MG/DL — SIGNIFICANT CHANGE UP (ref 0.5–1.3)
EGFR: 81 ML/MIN/1.73M2 — SIGNIFICANT CHANGE UP
ESTIMATED AVERAGE GLUCOSE: 105 — SIGNIFICANT CHANGE UP
GLUCOSE SERPL-MCNC: 100 MG/DL — HIGH (ref 70–99)
HDLC SERPL-MCNC: 49 MG/DL — SIGNIFICANT CHANGE UP
LIPID PNL WITH DIRECT LDL SERPL: 98 MG/DL — SIGNIFICANT CHANGE UP
NON HDL CHOLESTEROL: 110 MG/DL — SIGNIFICANT CHANGE UP
POTASSIUM SERPL-MCNC: 4.1 MMOL/L — SIGNIFICANT CHANGE UP (ref 3.5–5.3)
POTASSIUM SERPL-SCNC: 4.1 MMOL/L — SIGNIFICANT CHANGE UP (ref 3.5–5.3)
PROT SERPL-MCNC: 6.8 G/DL — SIGNIFICANT CHANGE UP (ref 6–8.3)
SODIUM SERPL-SCNC: 139 MMOL/L — SIGNIFICANT CHANGE UP (ref 135–145)
TRIGL SERPL-MCNC: 61 MG/DL — SIGNIFICANT CHANGE UP

## 2022-10-07 PROCEDURE — 99231 SBSQ HOSP IP/OBS SF/LOW 25: CPT | Mod: GC

## 2022-10-07 PROCEDURE — 99238 HOSP IP/OBS DSCHRG MGMT 30/<: CPT | Mod: GC

## 2022-10-07 RX ADMIN — QUETIAPINE FUMARATE 25 MILLIGRAM(S): 200 TABLET, FILM COATED ORAL at 13:03

## 2022-10-07 RX ADMIN — QUETIAPINE FUMARATE 25 MILLIGRAM(S): 200 TABLET, FILM COATED ORAL at 08:10

## 2022-10-07 RX ADMIN — Medication 150 MILLIGRAM(S): at 08:09

## 2022-10-07 RX ADMIN — Medication 0.25 MILLIGRAM(S): at 08:09

## 2022-10-07 RX ADMIN — AMLODIPINE BESYLATE 7.5 MILLIGRAM(S): 2.5 TABLET ORAL at 08:09

## 2022-10-07 RX ADMIN — LISINOPRIL 20 MILLIGRAM(S): 2.5 TABLET ORAL at 08:10

## 2022-10-07 NOTE — BH INPATIENT PSYCHIATRY PROGRESS NOTE - NSTXANXDATETRGT_PSY_ALL_CORE
15-Oct-2022
11-Oct-2022
05-Oct-2022
04-Oct-2022
15-Oct-2022
05-Oct-2022

## 2022-10-07 NOTE — BH INPATIENT PSYCHIATRY PROGRESS NOTE - NSTXHYPERGOAL_PSY_ALL_CORE
Will identify 1 modifiable risk factor and a strategy to improve this

## 2022-10-07 NOTE — BH INPATIENT PSYCHIATRY PROGRESS NOTE - NSBHMETABOLIC_PSY_ALL_CORE_FT
BMI: BMI (kg/m2): 23.6 (09-26-22 @ 12:32)  HbA1c:   Glucose:   BP: --  Lipid Panel: Date/Time: 09-26-22 @ 17:02  Cholesterol, Serum: 154  Direct LDL: --  HDL Cholesterol, Serum: 50  Total Cholesterol/HDL Ration Measurement: --  Triglycerides, Serum: 97   BMI: BMI (kg/m2): 23.6 (09-26-22 @ 12:32)  HbA1c: A1C with Estimated Average Glucose Result: 5.3 % (10-07-22 @ 08:30)    Glucose:   BP: --  Lipid Panel: Date/Time: 10-07-22 @ 08:30  Cholesterol, Serum: 159  Direct LDL: --  HDL Cholesterol, Serum: 49  Total Cholesterol/HDL Ration Measurement: --  Triglycerides, Serum: 61   BMI: BMI (kg/m2): 23.6 (09-26-22 @ 12:32)  HbA1c: A1C with Estimated Average Glucose Result: 5.3 % (10-07-22 @ 08:30)  Lipid Panel: Date/Time: 10-07-22 @ 08:30  Cholesterol, Serum: 159  HDL Cholesterol, Serum: 49  Triglycerides, Serum: 61

## 2022-10-07 NOTE — BH INPATIENT PSYCHIATRY PROGRESS NOTE - NSBHATTESTCOMMENTATTENDFT_PSY_A_CORE
Patient with some improvement in anxiety symptoms albeit he remains perseverative about his finances. He is however looking forward to going home today. He denies any thought of self-harm. Plan is to discharge home with wife today. No further med changes made and patient will be sent home with 30-days supply of venlafaxine. Rest of the history, exam, assessment, and plan as documented in Dr. Sinclair's note.

## 2022-10-07 NOTE — BH INPATIENT PSYCHIATRY PROGRESS NOTE - NSDCCRITERIA_PSY_ALL_CORE
Patient is able to control his anxiety, distressing thoughts and apprehensive behavior. 

## 2022-10-07 NOTE — BH INPATIENT PSYCHIATRY PROGRESS NOTE - NSBHFUPINTERVALHXFT_PSY_A_CORE
Patient was seen and evaluated by the attending psychiatrist and the resident . Case was discussed with the team. Chart was reviewed and patient's blood pressure is higher than his usual baseline (168/75 standing & 165/73). No acute events overnight. Patient is feeling little anxious about going back home. He stated that he has to face his problems again. He reported adequate appetite and sleep.  Patient was seen and evaluated by the attending psychiatrist and the resident . Case was discussed with the team. Chart was reviewed and patient's blood pressure is higher than his usual baseline (168/75 standing & 165/73). No acute events overnight. Patient is feeling little anxious about going back home. He stated that he has to face his problems again. He reported adequate appetite and sleep. Patient was advised to focus on his own wellbeing. If he is able to take care of his anxiety, he will be able to work again and take care of finances and help his wife. He can rely on his wife now as she is handling the situation now. He would be more helpful if he is able to get his anxiety down and be able to be more functional in near future.  Patient was seen and evaluated by the attending psychiatrist and the resident . Case was discussed with the team. Chart was reviewed and patient's blood pressure is higher than his usual baseline (168/75 standing & 165/73). On Manual check on RUE, BP: 128/68, pulse: 70 & 134/70, Pulse:78. No acute events overnight. Patient is feeling little anxious about going back home. He stated that he has to face his problems again. He reported adequate appetite and sleep. Patient was advised to focus on his own wellbeing. If he is able to take care of his anxiety, he will be able to work again and take care of finances and help his wife. He can rely on his wife now as she is handling the situation now. He would be more helpful if he is able to get his anxiety down and be able to be more functional in near future.

## 2022-10-07 NOTE — BH INPATIENT PSYCHIATRY PROGRESS NOTE - NSTXSLPPATINTERMD_PSY_ALL_CORE
Sleep hygiene and med management

## 2022-10-07 NOTE — BH INPATIENT PSYCHIATRY PROGRESS NOTE - NSBHFUPINTERVALCCFT_PSY_A_CORE
Anxiety, Reactive Depression 
Anxiety, depression 
Anxiety
Anxiety, reactive depression
"I still feel anxious about our finances"
Anxiety, Depression
Anxiety, reactive depression 
Anxiety 
"I still feel anxious, maybe a little better"

## 2022-10-07 NOTE — BH INPATIENT PSYCHIATRY PROGRESS NOTE - NSICDXBHPRIMARYDX_PSY_ALL_CORE
Generalized anxiety disorder   F41.1  

## 2022-10-07 NOTE — BH INPATIENT PSYCHIATRY PROGRESS NOTE - NSBHATTESTSTAFFAMEND_PSY_A_CORE
I have personally seen and examined this patient. I fully participated in the care of this patient. I have made amendments to the documentation where appropriate and otherwise agree with the history, physical exam, and plan as documented by the

## 2022-10-07 NOTE — BH INPATIENT PSYCHIATRY PROGRESS NOTE - NSTXDEPRESGOAL_PSY_ALL_CORE
Attend and participate in at least 2 groups daily despite low mood/energy

## 2022-10-07 NOTE — BH INPATIENT PSYCHIATRY PROGRESS NOTE - NSBHCHARTREVIEWVS_PSY_A_CORE FT
Vital Signs Last 24 Hrs  T(C): 36.1 (10-07-22 @ 08:15), Max: 36.5 (10-06-22 @ 15:24)  T(F): 97 (10-07-22 @ 08:15), Max: 97.7 (10-06-22 @ 15:24)  HR: --  BP: --  BP(mean): --  RR: --  SpO2: --    Orthostatic VS  10-07-22 @ 08:15  Lying BP: --/-- HR: --  Sitting BP: 168/75 HR: 82  Standing BP: 165/73 HR: 81  Site: --  Mode: --  Orthostatic VS  10-06-22 @ 08:42  Lying BP: --/-- HR: --  Sitting BP: 153/73 HR: 64  Standing BP: 150/74 HR: 84  Site: --  Mode: --   Vital Signs Last 24 Hrs  T(C): 36.1 (10-07-22 @ 08:15), Max: 36.5 (10-06-22 @ 15:24)  T(F): 97 (10-07-22 @ 08:15), Max: 97.7 (10-06-22 @ 15:24)  HR: --  BP: --  BP(mean): --  RR: --  SpO2: --    Orthostatic VS  10-07-22 @ 12:03  Lying BP: --/-- HR: --  Sitting BP: 128/68 HR: 70  Standing BP: 134/70 HR: 78  Site: upper right arm  Mode: auscultated w/ stethoscope  Orthostatic VS  10-07-22 @ 08:15  Lying BP: --/-- HR: --  Sitting BP: 168/75 HR: 82  Standing BP: 165/73 HR: 81  Site: --  Mode: --  Orthostatic VS  10-06-22 @ 08:42  Lying BP: --/-- HR: --  Sitting BP: 153/73 HR: 64  Standing BP: 150/74 HR: 84  Site: --  Mode: --   Vital Signs Last 24 Hrs  T(C): 37.6 (10-07-22 @ 15:51), Max: 37.6 (10-07-22 @ 15:51)  T(F): 99.7 (10-07-22 @ 15:51), Max: 99.7 (10-07-22 @ 15:51)    Orthostatic VS  10-07-22 @ 12:03  Sitting BP: 128/68 HR: 70  Standing BP: 134/70 HR: 78  Site: upper right arm  Mode: auscultated w/ stethoscope    Orthostatic VS  10-07-22 @ 08:15  Sitting BP: 168/75 HR: 82  Standing BP: 165/73 HR: 81

## 2022-10-07 NOTE — BH INPATIENT PSYCHIATRY PROGRESS NOTE - NSTXANXDATEEST_PSY_ALL_CORE
27-Sep-2022
27-Sep-2022
26-Sep-2022
27-Sep-2022

## 2022-10-07 NOTE — BH INPATIENT PSYCHIATRY PROGRESS NOTE - NSCGIIMPROVESX_PSY_ALL_CORE
2 = Much improved - notably better with signficant reduction of symptoms; increase in the level of functioning but some symptoms remain
3 = Minimally improved - slightly better with little or no clinically meaningful reduction of symptoms.  Represents very little change in basic clinical status, level of care, or functional capacity.
3 = Minimally improved - slightly better with little or no clinically meaningful reduction of symptoms.  Represents very little change in basic clinical status, level of care, or functional capacity.
2 = Much improved - notably better with signficant reduction of symptoms; increase in the level of functioning but some symptoms remain
3 = Minimally improved - slightly better with little or no clinically meaningful reduction of symptoms.  Represents very little change in basic clinical status, level of care, or functional capacity.
3 = Minimally improved - slightly better with little or no clinically meaningful reduction of symptoms.  Represents very little change in basic clinical status, level of care, or functional capacity.

## 2022-10-07 NOTE — BH INPATIENT PSYCHIATRY PROGRESS NOTE - NSBHATTESTBILLINGAW_PSY_A_CORE
[id number]-Rtbkaotail Inpatient care - low complexity - 15 minutes
[id number]-Upiuilruly Inpatient care - moderate complexity - 25 minutes
[id number]-Hctzkxxmvn Inpatient care - moderate complexity - 25 minutes
[id number]-Khfmfvjdwj Inpatient care - moderate complexity - 25 minutes
[id number]-Wgwkwmyvbb Inpatient care - low complexity - 15 minutes
[id number]-Dglomhqrpw Inpatient care - moderate complexity - 25 minutes
[id number]-Wysxwirlhb Inpatient care - low complexity - 15 minutes
[id number]-Kldwmsimne Inpatient care - moderate complexity - 25 minutes
[id number]-Fpfyaaomvu Inpatient care - low complexity - 15 minutes

## 2022-10-07 NOTE — BH INPATIENT PSYCHIATRY PROGRESS NOTE - NSTXDEPRESINTERMD_PSY_ALL_CORE
Med management with venlafaxine and individual and group therapy.  
Med titration
Med management with venlafaxine and individual and group therapy.  
Med titration
Med management with venlafaxine and individual and group therapy.

## 2022-10-07 NOTE — BH INPATIENT PSYCHIATRY PROGRESS NOTE - NSTXDCOTHRGOAL_PSY_ALL_CORE
Will demonstrate decrease in anxiety symptoms in order to participate in discharge planning.
pt will comply with treatment and improve mental status in order to effectively engage with dc planning.
Will demonstrate decrease in anxiety symptoms in order to participate in discharge planning.
pt will comply with treatment and improve mental status in order to effectively engage with dc planning.
pt will comply with treatment and improve mental status in order to effectively engage with dc planning.
Will demonstrate decrease in anxiety symptoms in order to participate in discharge planning.

## 2022-10-07 NOTE — BH INPATIENT PSYCHIATRY PROGRESS NOTE - NSTXDCOTHRDATETRGT_PSY_ALL_CORE
04-Oct-2022
04-Oct-2022
11-Oct-2022
11-Oct-2022
04-Oct-2022
11-Oct-2022
04-Oct-2022

## 2022-10-07 NOTE — BH INPATIENT PSYCHIATRY PROGRESS NOTE - NSTXANXINTERMD_PSY_ALL_CORE
Med titration with SNRI--started on venlafaxine and is tolerating it well so far. Will also continue quetiapine and PRN clonazepam
Med titration with SNRI--started on venlafaxine and is tolerating it well so far. Will also continue quetiapine and PRN clonazepam
Med management with SRI
Med titration with SNRI--started on venlafaxine and is tolerating it well so far. Will also continue quetiapine and PRN clonazepam
Med titration with SNRI; pt started on venlafaxine and is tolerating it well so far. Will also continue mirtazapine, quetiapine, and clonazepam
Med titration with SNRI--started on venlafaxine and is tolerating it well so far. Will also continue quetiapine and PRN clonazepam
Med titration with SNRI--started on venlafaxine and is tolerating it well so far. Will also continue quetiapine and PRN clonazepam
Med management with SRI
Med titration with SNRI; pt started on venlafaxine and is tolerating it well so far. Will also continue mirtazapine, quetiapine, and clonazepam

## 2022-10-07 NOTE — BH INPATIENT PSYCHIATRY PROGRESS NOTE - NSTXANXGOAL_PSY_ALL_CORE
Be able to participate in activities despite lingering anxiety/panic
Identify and practice 3 coping skills to manage anxiety
Be able to participate in activities despite lingering anxiety/panic
Be able to participate in activities despite lingering anxiety/panic
Identify and practice 3 coping skills to manage anxiety

## 2022-10-07 NOTE — BH INPATIENT PSYCHIATRY PROGRESS NOTE - NSBHASSESSSUMMFT_PSY_ALL_CORE
2-year-old  male, unemployed, currently domiciled with wife in [place], with PMHx of HTN, BPH, with Past psych Hx significant for a previous suicide attempt by cutting wrist in July 2021, 2 previous psych admissions at [facility] and [facility], admitted to inpatient geriatric unit after worsening anxiety prompting wife to make hotline call over the weekend that she could not take care of the patient anymore.    Patient was maintained on Remeron 30 mg qhs, Seroquel 25 mg TID, and Klonopin 0.25 q AM & q afternoon. Patient's mood and mental status was maintained well until 1 month ago when his daughter decided to move out and patient felt more financial burden. 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     At Geriatric clinic, patient signed voluntary papers but only after much prompting from his wife.     10/07 Clinical update:  Patient is anxious about going back home as he will be in need to face his financial problems. In general, his level of anxiety is less than when he was first admitted. Patient will be discharged on a dose of Venlafaxine of 150 mg / day & One dose of Klonopin 0.25 mg at 6 AM, with Mirtazepine and Seroquel as the same home dose. Patient is receiving psychotherapy trying to help with coping strategies and way of thinking. Patient will be receiving follow up management after discharge at outpatient basis.     Plan:  -Will continue home meds--mirtazapine 30mg qhs and quetiapine 25mg TID  -Will continue Venlafaxine XR 150mg qd  -Will continue Klonopin 0.25mg at 6AM as well as PRN Klonopin at 0.25mg q daily PRN for anxiety   Medical management:    HTN:      Will Continue with Norvasc 7.5 mg qd.      Will continue with Lisinopril 20 mg qd      Will Continue with DASH Diet     HLD:     Will Continue with Atorvastatin 10 mg qhs    Hemorrhoids:    Will continue bowel regimen 2-year-old  male, unemployed, currently domiciled with wife in [place], with PMHx of HTN, BPH, with Past psych Hx significant for a previous suicide attempt by cutting wrist in July 2021, 2 previous psych admissions at [facility] and [facility], admitted to inpatient geriatric unit after worsening anxiety prompting wife to make hotline call over the weekend that she could not take care of the patient anymore.    Patient was maintained on Remeron 30 mg qhs, Seroquel 25 mg TID, and Klonopin 0.25 q AM & q afternoon. Patient's mood and mental status was maintained well until 1 month ago when his daughter decided to move out and patient felt more financial burden. 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     At Geriatric clinic, patient signed voluntary papers but only after much prompting from his wife.     10/07 Clinical update:  Patient is anxious about going back home as he will be in need to face his financial problems. In general, his level of anxiety is less than when he was first admitted. Patient will be discharged on a dose of Venlafaxine of 150 mg / day & One dose of Klonopin 0.25 mg at 6 AM, with Mirtazepine and Seroquel as the same home dose. Patient is receiving psychotherapy trying to help with coping strategies and way of thinking. Patient will be receiving follow up management after discharge at outpatient basis. Today; Patient's blood pressure is higher than his usual baseline (168/75 standing & 165/73). On Manual check on RUE, BP: 128/68, pulse: 70 & 134/70, Pulse:78. Reflecting fair control of vital signs.     Plan:  -Will continue home meds--mirtazapine 30mg qhs and quetiapine 25mg TID  -Will continue Venlafaxine XR 150mg qd  -Will continue Klonopin 0.25mg at 6AM as well as PRN Klonopin at 0.25mg q daily PRN for anxiety   Medical management:    HTN:      Will Continue with Norvasc 7.5 mg qd.      Will continue with Lisinopril 20 mg qd      Will Continue with DASH Diet     HLD:     Will Continue with Atorvastatin 10 mg qhs    Hemorrhoids:    Will continue bowel regimen

## 2022-10-07 NOTE — BH INPATIENT PSYCHIATRY PROGRESS NOTE - NSTXDCOTHRDATEEST_PSY_ALL_CORE
27-Sep-2022

## 2022-10-07 NOTE — BH INPATIENT PSYCHIATRY PROGRESS NOTE - NSTXDEPRESDATEEST_PSY_ALL_CORE
26-Sep-2022

## 2022-10-07 NOTE — BH INPATIENT PSYCHIATRY PROGRESS NOTE - MSE UNSTRUCTURED FT
Patient appears stated age with adequate grooming and hygiene. Cooperative on encounter with fair eye contact. Patient is still exhibiting psychomotor hyperactivity. No abnormal movements were noted. Steady gait observed. Reported mood "maybe a little better" with anxious affect although with restricted range. Monotonous speech is noted. Thought process is linear & goal directed. Thought content notable for catastrophic thinking with secondary anticipatory apprehension. Patient denies any current suicidal ideations either active or passive. Patient denies any perceptual abnormalities. Insight is intact with fair judgement. Impulse control intact at the time of exam. Oriented X3.  Patient appears stated age with adequate grooming and hygiene. Cooperative on encounter with fair eye contact. Patient is still restless wandering around the unit. No abnormal movements were noted. Steady gait observed. Reported mood "maybe a little better" with anxious affect although with restricted range. Monotonous speech is noted. Thought process is linear & goal directed. Thought content notable for catastrophic thinking with secondary anticipatory apprehension. Patient denies any current suicidal ideations either active or passive. Patient denies any perceptual abnormalities. Insight is intact with fair judgement. Impulse control intact at the time of exam. Oriented X3.

## 2022-10-07 NOTE — BH INPATIENT PSYCHIATRY PROGRESS NOTE - NSTXSLPPATGOAL_PSY_ALL_CORE
Be able to sleep for a minimum of six hours daily

## 2022-10-07 NOTE — BH INPATIENT PSYCHIATRY PROGRESS NOTE - NSTXDCOTHRINTERMD_PSY_ALL_CORE
Will coordinate discharge with wife
Will coordinate discharge home with wife
Will coordinate discharge with wife
Will coordinate discharge home with wife
Will coordinate discharge with wife
Will coordinate discharge with wife

## 2022-10-07 NOTE — BH INPATIENT PSYCHIATRY PROGRESS NOTE - NSTXDEPRESDATETRGT_PSY_ALL_CORE
05-Oct-2022

## 2022-10-07 NOTE — BH INPATIENT PSYCHIATRY PROGRESS NOTE - NSTXPROBHYPER_PSY_ALL_CORE
HYPERTENSION

## 2022-10-07 NOTE — BH INPATIENT PSYCHIATRY PROGRESS NOTE - NSICDXBHSECONDARYDX_PSY_ALL_CORE
Reactive depression   F32.9  Hypertension   I10  Mild hyperlipidemia   E78.5  External hemorrhoid   K64.4  

## 2022-10-12 ENCOUNTER — OUTPATIENT (OUTPATIENT)
Dept: OUTPATIENT SERVICES | Facility: HOSPITAL | Age: 72
LOS: 1 days | Discharge: HOME | End: 2022-10-12
Payer: MEDICARE

## 2022-10-12 DIAGNOSIS — F41.9 ANXIETY DISORDER, UNSPECIFIED: ICD-10-CM

## 2022-10-12 PROCEDURE — 90792 PSYCH DIAG EVAL W/MED SRVCS: CPT | Mod: 95

## 2022-12-14 ENCOUNTER — OUTPATIENT (OUTPATIENT)
Dept: OUTPATIENT SERVICES | Facility: HOSPITAL | Age: 72
LOS: 1 days | Discharge: HOME | End: 2022-12-14
Payer: MEDICARE

## 2022-12-14 PROCEDURE — 90792 PSYCH DIAG EVAL W/MED SRVCS: CPT

## 2022-12-15 ENCOUNTER — OUTPATIENT (OUTPATIENT)
Dept: OUTPATIENT SERVICES | Facility: HOSPITAL | Age: 72
LOS: 1 days | Discharge: HOME | End: 2022-12-15

## 2022-12-19 VITALS
OXYGEN SATURATION: 98 % | TEMPERATURE: 98 F | RESPIRATION RATE: 16 BRPM | SYSTOLIC BLOOD PRESSURE: 164 MMHG | HEART RATE: 70 BPM | DIASTOLIC BLOOD PRESSURE: 76 MMHG

## 2022-12-19 PROCEDURE — 90870 ELECTROCONVULSIVE THERAPY: CPT

## 2022-12-19 RX ORDER — FLUMAZENIL 0.1 MG/ML
0.2 VIAL (ML) INTRAVENOUS ONCE
Refills: 0 | Status: COMPLETED | OUTPATIENT
Start: 2022-12-19 | End: 2022-12-19

## 2022-12-19 RX ADMIN — Medication 0.2 MILLIGRAM(S): at 14:47

## 2022-12-27 PROCEDURE — 99213 OFFICE O/P EST LOW 20 MIN: CPT

## 2022-12-28 VITALS
HEART RATE: 92 BPM | SYSTOLIC BLOOD PRESSURE: 154 MMHG | OXYGEN SATURATION: 96 % | RESPIRATION RATE: 18 BRPM | TEMPERATURE: 98 F | DIASTOLIC BLOOD PRESSURE: 70 MMHG

## 2022-12-28 PROCEDURE — 90870 ELECTROCONVULSIVE THERAPY: CPT

## 2022-12-28 RX ORDER — FLUMAZENIL 0.1 MG/ML
0.2 VIAL (ML) INTRAVENOUS ONCE
Refills: 0 | Status: COMPLETED | OUTPATIENT
Start: 2022-12-28 | End: 2022-12-28

## 2022-12-28 RX ADMIN — Medication 0.2 MILLIGRAM(S): at 12:06

## 2022-12-28 NOTE — ECT PRE-PROCEDURE CHECKLIST - NSMEDSDOSETAKEN_PSY_ALL_CORE
0530 amlodipine, lisinopril 1000 Zyprexa, Lexapro, Klonopin
0530 amlodipine, lisinopril 1000 Zyprexa, Lexapro, Klonopin

## 2022-12-28 NOTE — ECT AMBULATORY DISCHARGE PLAN - NSPOSTECTCONTPROV_PSY_ALL_CORE
[facility] ([facility]) ECT Suite at [phone]
[facility] ([facility]) ECT Suite at [phone]

## 2022-12-28 NOTE — ECT AMBULATORY DISCHARGE PLAN - NSDCPEFALRISK_GEN_ALL_CORE
For information on Fall & Injury Prevention, visit: [email or web address] OR  [email or web address] OR  https://www.cdc.gov/steadi/patient.html
For information on Fall & Injury Prevention, visit: [email or web address] OR  [email or web address] OR  https://www.cdc.gov/steadi/patient.html

## 2022-12-28 NOTE — ECT TREATMENT NOTE - BP NONINVASIVE SYSTOLIC (MM HG)
Change in pharmacy    PCP: Ivan Regan DO    Last appt: 2/18/2020  Future Appointments   Date Time Provider Crow Jerez   8/19/2020 10:40 AM Kenyetta Arnold DO [facility]       Requested Prescriptions     Pending Prescriptions Disp Refills    sertraline (ZOLOFT) 25 mg tablet 90 Tab 3       Pharmacy [facility]    Patient has ? days' supply of medication available.     Prior labs and Blood pressures:  BP Readings from Last 3 Encounters:   02/12/20 118/62   02/07/20 130/60   10/07/19 127/67     Lab Results   Component Value Date/Time    Sodium 138 09/23/2019 01:52 PM    Potassium 4.2 09/23/2019 01:52 PM    Chloride 107 09/23/2019 01:52 PM    CO2 27 09/23/2019 01:52 PM    Anion gap 4 (L) 09/23/2019 01:52 PM    Glucose 84 09/23/2019 01:52 PM    BUN 18 09/23/2019 01:52 PM    Creatinine 1.08 (H) 09/23/2019 01:52 PM    BUN/Creatinine ratio 17 09/23/2019 01:52 PM    GFR est AA 59 (L) 09/23/2019 01:52 PM    GFR est non-AA 48 (L) 09/23/2019 01:52 PM    Calcium 9.2 09/23/2019 01:52 PM     Lab Results   Component Value Date/Time    Hemoglobin A1c 5.5 09/13/2019 08:33 AM    Hemoglobin A1c (POC) 5.5 02/17/2017 09:40 AM     Lab Results   Component Value Date/Time    Cholesterol, total 203 (H) 09/13/2019 08:33 AM    HDL Cholesterol 80 09/13/2019 08:33 AM    LDL, calculated 112 (H) 09/13/2019 08:33 AM    VLDL, calculated 11 09/13/2019 08:33 AM    Triglyceride 55 09/13/2019 08:33 AM    CHOL/HDL Ratio 2.8 05/05/2010 08:45 AM     Lab Results   Component Value Date/Time    Vitamin D 25-Hydroxy 24 (L) 10/08/2010 04:19 PM    VITAMIN D, 25-HYDROXY 42.9 09/13/2019 08:33 AM       Lab Results   Component Value Date/Time    TSH 1.44 09/23/2019 01:52 PM
164
163

## 2022-12-28 NOTE — ECT AMBULATORY DISCHARGE PLAN - NSPOSTECTCALLBEFORE_PSY_ALL_CORE
[facility] ([facility]) scheduling office at [phone]
[facility] ([facility]) scheduling office at [phone]

## 2022-12-28 NOTE — ECT TREATMENT NOTE - NSICDXBHPRIMARYDX_PSY_ALL_CORE
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

## 2022-12-28 NOTE — ECT TREATMENT NOTE - NSECTCOMMENTS_PSY_ALL_CORE
23rd RUL tx (transfer from [facility] for continuation of maintenance ECT)   2nd weekly tx  Patient reports he continues to feel worried all the time despite 22nd ECT sessions. Worrying worse in the morning, improves through the day however finds it difficult to enjoy any activities he does due to the worrying. Denies persistently depressed mood. He reports fair sleep, appetite - fine. Denies passive death wishes, suicidal ideations/intent/plan.   He spends his days attending the PHP daily. Other times he runs some errands with his wife, goes grocery shopping. Unable to enjoy any of these activities since he continues to "worry all the time".     Collateral from spouse.   Spouse reports patient is not back to baseline. He is better than when he got admitted to the hospital. However, continues to have lack of interest in activities, difficulty making decisions. He is pacing back and forth every morning. Still has ongoing anxiety during the day. Mood - "not so good". He is attending PHP right now, does not do much else, he takes some naps during the day. Scheduled to f/u with outpatient psychiatrist   
24rd RUL tx (transfer from [facility] for continuation of maintenance ECT)   23rd weekly tx  Patient keeps saying that he is "the same" as prior to tx. Reports severe anxiety, but denies overt depressed mood. Sill decribed worse anxiety in the morning, improves through the day however finds it difficult to enjoy any activities he does due to the worrying. Denies passive death wishes, suicidal ideations/intent/plan.   Will cont maintenance tx for now.

## 2022-12-28 NOTE — ECT AMBULATORY DISCHARGE PLAN - NSPOSTECTPROVEDUCFT_PSY_ALL_CORE
post ECT discharge instructions. covid educational material 
post ECT discharge instructions. covid educational material

## 2022-12-28 NOTE — ECT AMBULATORY DISCHARGE PLAN - PATIENT PORTAL LINK FT
You can access the FollowMyHealth Patient Portal offered by [facility] by registering at the following website: [email or web address]. By joining [employer] [facility]’s FollowMyHealth portal, you will also be able to view your health information using other applications (apps) compatible with our system.
You can access the FollowMyHealth Patient Portal offered by [facility] by registering at the following website: [email or web address]. By joining [facility]’s FollowMyHealth portal, you will also be able to view your health information using other applications (apps) compatible with our system.

## 2022-12-28 NOTE — ECT AMBULATORY DISCHARGE PLAN - NSPOSTECTCALLZHH_PSY_ALL_CORE
Call the [facility] ECT suite at [phone]
Call the [facility] ECT suite at [phone]

## 2022-12-28 NOTE — ECT TREATMENT NOTE - NSSUICPROTFACT_PSY_ALL_CORE
Supportive social network of family or friends
Responsibility to children, family, or others/Supportive social network of family or friends

## 2023-01-03 PROCEDURE — 99213 OFFICE O/P EST LOW 20 MIN: CPT

## 2023-01-11 DIAGNOSIS — F33.2 MAJOR DEPRESSIVE DISORDER, RECURRENT SEVERE WITHOUT PSYCHOTIC FEATURES: ICD-10-CM

## 2023-01-26 DIAGNOSIS — F41.9 ANXIETY DISORDER, UNSPECIFIED: ICD-10-CM

## 2023-01-31 ENCOUNTER — OUTPATIENT (OUTPATIENT)
Dept: OUTPATIENT SERVICES | Facility: HOSPITAL | Age: 73
LOS: 1 days | Discharge: HOME | End: 2023-01-31
Payer: MEDICARE

## 2023-02-02 DIAGNOSIS — F33.2 MAJOR DEPRESSIVE DISORDER, RECURRENT SEVERE WITHOUT PSYCHOTIC FEATURES: ICD-10-CM

## 2023-02-07 PROCEDURE — 90834 PSYTX W PT 45 MINUTES: CPT | Mod: 95

## 2023-02-14 PROCEDURE — 99443: CPT | Mod: 95

## 2023-02-14 PROCEDURE — 90834 PSYTX W PT 45 MINUTES: CPT | Mod: 95

## 2023-02-28 PROCEDURE — 90834 PSYTX W PT 45 MINUTES: CPT | Mod: 95

## 2023-02-28 PROCEDURE — 99214 OFFICE O/P EST MOD 30 MIN: CPT | Mod: 95

## 2023-03-07 PROCEDURE — 90834 PSYTX W PT 45 MINUTES: CPT | Mod: 95

## 2023-03-09 ENCOUNTER — EMERGENCY (EMERGENCY)
Facility: HOSPITAL | Age: 73
LOS: 1 days | Discharge: HOME | End: 2023-03-09
Admitting: EMERGENCY MEDICINE
Payer: MEDICARE

## 2023-03-09 VITALS
RESPIRATION RATE: 18 BRPM | HEART RATE: 88 BPM | SYSTOLIC BLOOD PRESSURE: 167 MMHG | TEMPERATURE: 98 F | OXYGEN SATURATION: 99 % | DIASTOLIC BLOOD PRESSURE: 83 MMHG

## 2023-03-09 VITALS
DIASTOLIC BLOOD PRESSURE: 84 MMHG | SYSTOLIC BLOOD PRESSURE: 155 MMHG | TEMPERATURE: 98 F | HEART RATE: 93 BPM | RESPIRATION RATE: 18 BRPM | OXYGEN SATURATION: 98 %

## 2023-03-09 DIAGNOSIS — F32.9 MAJOR DEPRESSIVE DISORDER, SINGLE EPISODE, UNSPECIFIED: ICD-10-CM

## 2023-03-09 DIAGNOSIS — F41.9 ANXIETY DISORDER, UNSPECIFIED: ICD-10-CM

## 2023-03-09 PROCEDURE — 98968 PH1 ASSMT&MGMT NQHP 21-30: CPT | Mod: CR

## 2023-03-09 PROCEDURE — 90792 PSYCH DIAG EVAL W/MED SRVCS: CPT

## 2023-03-09 PROCEDURE — 99284 EMERGENCY DEPT VISIT MOD MDM: CPT

## 2023-03-09 RX ADMIN — Medication 0.25 MILLIGRAM(S): at 17:39

## 2023-03-09 NOTE — ED BEHAVIORAL HEALTH ASSESSMENT NOTE - DETAILS
see separate  safety pan notes informed RUTHY Carty. discussed with LEXIE Olmos re: current intervention 1 prior SA in 7/2021; denied any SIB father:  from [facility] at 42 yrs old (Pt was 7 yrs old). no reported hx of mental illness. no hx of SA. no illicit substance use

## 2023-03-09 NOTE — ED ADULT TRIAGE NOTE - CHIEF COMPLAINT QUOTE
Patient has c/o severe anxiety and depression. He is in the partial step down program and to be reevaluated for admission to new unit at [facility]. Pt is on medications that don't seem to be working. Patient has suicidal ideations without a plan.

## 2023-03-09 NOTE — ED BEHAVIORAL HEALTH ASSESSMENT NOTE - SAFETY PLAN ADDT'L DETAILS
Safety plan discussed with.../Education provided regarding environmental safety / lethal means restriction/Provision of National Suicide Prevention Lifeline 7-307-169-GMTI (7885)

## 2023-03-09 NOTE — ED PROVIDER NOTE - PHYSICAL EXAMINATION
CONSTITUTIONAL: appears anxious  NEURO: Alert & oriented. Gait steady without assistance. Sensory and motor functions are grossly intact.  NECK: Supple  CARD: Regular rate and rhythm, no murmurs  RESP: No accessory muscle use; breath sounds clear and equal bilaterally; no wheezes, rhonchi, or rales     ABD: Soft; non-distended; non-tender.   MUSCULOSKELETAL/EXTREMITIES: FROM in all four extremities; no extremity edema.  SKIN: Warm; dry; no apparent lesions or exudate

## 2023-03-09 NOTE — ED BEHAVIORAL HEALTH ASSESSMENT NOTE - DIFFERENTIAL
MDD vs adjustment disorder with mixed anxiety and depressed mood  PTSD (flashbacks of slitting his left wrist)  HANK (excessively worrying about everything x > 6 months now)

## 2023-03-09 NOTE — ED BEHAVIORAL HEALTH ASSESSMENT NOTE - OTHER
PRASANNA DOUGLASS  Reference #: [id number] - prescribed last 02/14/2023 and filled on 02/26/2023 with clonazepam 0.5 mg x 30 tablets for 30 days by  Steve Rosales	[id number]	Insurance	[id number] wife work related stressor, dealing with poorly controlled depression + anxiety symptoms distracted [facility] LUCIANA Carty

## 2023-03-09 NOTE — ED BEHAVIORAL HEALTH ASSESSMENT NOTE - NS ED BHA PLAN TR BH CONTACTED FT
discussed and informed A Carloz re: service's plan for discharge.. does not meet threshold for involuntary psych admission. did offer voluntary but refused

## 2023-03-09 NOTE — ED BEHAVIORAL HEALTH ASSESSMENT NOTE - NSSUICPROTFACT_PSY_ALL_CORE
Responsibility to children, family, or others/Identifies reasons for living/Supportive social network of family or friends/Engaged in work or school/Positive therapeutic relationships/Restoration beliefs

## 2023-03-09 NOTE — ED ADULT NURSE NOTE - CAS EDN DISCHARGE ASSESSMENT
clear for dc by provider/Alert and oriented to person, place and time/Patient baseline mental status

## 2023-03-09 NOTE — ED ADULT NURSE NOTE - OBJECTIVE STATEMENT
Patient has c/o severe anxiety and depression. He is in the partial step down program and to be reevaluated for admission to new unit at [facility]. Pt is on medications that don't seem to be working. Patient has suicidal ideations without a plan.

## 2023-03-09 NOTE — ED BEHAVIORAL HEALTH NOTE - BEHAVIORAL HEALTH NOTE
As per provider, worker was informed to call pt’s wife for collateral information. Worker called patient’s Wife Mia ([phone]) for collateral information.     Wife reports that she drove the patient to the ED. She states that patient suffers from anxiety and depression. She states that the patient has been getting worse and was admitted to  two months ago and admitted for a week. Patient worsened when he was discharged. Wife kept repeating that she needs to go back to work and there is a lot going on. Wife states that she wants to take him to [facility] for treatment. She states that the patient sees Dr. Rosales at [facility]. She states that the patient also sees  Sanjeev Willams at the geriatric clinic.       Worker then called  Sanjeev Willams ([phone]) for collateral information. Sanjeev Willams states that the patient is new to their clinic and started in their clinic around 1/31/2023. She states that the patient’s wife drove him to the ED. Patient was referred through adult partial program. Patient was admitted to [facility] in October 2022 for a serious SA where he cut his wrists and wife found him in the bathtub. She states that the year prior patient had another SA (she cannot recall what was the attempt).  She states that the patient is now with the [facility] geriatric clinic. Patient is linked to Dr. Rosales. She states that the patient started a new job at the [employer] in [facility] as a . This job is overwhelming for patient because it is computerized, and this is causing patient additional anxiety. She states that the patient’s wife is a school nurse and took off work for a week because she has been afraid to leave him. Patient has been pacing back and forth and tapping his head. Patient has been anxious. Patient is in a step program through [facility] and patient has not been very vocal in program. Patient’s medication has not been changed since his discharge from the partial program.  Patient states that the patient went to his stepdaughter’s wedding in [place] and at that time he was very anxious to get on plane. When patient came back from this trip he went back to work and became very anxious. Patient’s wife wanted to send the patient to [place] with his family members; because of his anxiety and because she did not want to leave the patient alone. Sanjeev states that she tried to re-enroll the patient back in to [facility] at [facility] and at that time they did not have room. Patient then agreed to come to the ed. She states that she received a call back from [facility] and they were willing to take the patient on Monday, but this is not in person. Patient’s wife wants him to be at [facility] or [facility] [place] at [facility]. Patient did report that he was afraid he was suicidal. Sanjeev states that the patient is prescribed Remeron 30 mg at bedtime, Lexapro 20 once daily, Klonopin 0.5 one half tab twice a day in the am and afternoon, Zyprexa 15 mg daily, Amlodipine 10 mg daily, Lisinopril 20 mg two tabs daily and Atorvastatin 20 mg at bedtime. Patient is also on Lotrel (unsure of dosage) and melatonin. Patient’s anxiety is through the roof. Patient claims he has been sleeping. Sanjeev states that the patient’s anxiety is increasing. Case discussed with psychiatry. As per provider, worker was informed to call pt’s wife for collateral information. Worker called patient’s Wife Mia ([phone]) for collateral information.     Wife reports that she drove the patient to the ED. She states that patient suffers from anxiety and depression. She states that the patient has been getting worse and was admitted to  two months ago and admitted for a week. Patient worsened when he was discharged. Wife kept repeating that she needs to go back to work and there is a lot going on. Wife states that she wants to take him to [facility] for treatment. She states that the patient sees Dr. Rosales at [facility]. She states that the patient also sees  Sanjeev Willams at the geriatric clinic.       Worker then called  Sanjeev Willams ([phone]) for collateral information. Sanjeev Willams states that the patient is new to their clinic and started in their clinic around 1/31/2023. She states that the patient’s wife drove him to the ED. Patient was referred through adult partial program. Patient was admitted to [facility] in October 2022 for a serious SA where he cut his wrists and wife found him in the bathtub. She states that the year prior patient had another SA (she cannot recall what was the attempt).  She states that the patient is now with the [facility] geriatric clinic. Patient is linked to Dr. Rosales. She states that the patient started a new job at the [employer] in [facility] as a . This job is overwhelming for patient because it is computerized, and this is causing patient additional anxiety. She states that the patient’s wife is a school nurse and took off work for a week because she has been afraid to leave him. Patient has been pacing back and forth and tapping his head. Patient has been anxious. Patient is in a step program through [facility] and patient has not been very vocal in program. Patient’s medication has not been changed since his discharge from the partial program.  Patient states that the patient went to his stepdaughter’s wedding in [place] and at that time he was very anxious to get on plane. When patient came back from this trip he went back to work and became very anxious. Patient’s wife wanted to send the patient to [place] with his family members; because of his anxiety and because she did not want to leave the patient alone. Sanjeev states that she tried to re-enroll the patient back in to [facility] at [facility] and at that time they did not have room. Patient then agreed to come to the ed. She states that she received a call back from [facility] and they were willing to take the patient on Monday, but this is not in person. Patient’s wife wants him to be at [facility] or newer building at [facility]. Patient did report that he was afraid he was suicidal. Sanjeev states that the patient is prescribed Remeron 30 mg at bedtime, Lexapro 20 once daily, Klonopin 0.5 one half tab twice a day in the am and afternoon, Zyprexa 15 mg daily, Amlodipine 10 mg daily, Lisinopril 20 mg two tabs daily and Atorvastatin 20 mg at bedtime. Patient is also on Lotrel (unsure of dosage) and melatonin. Patient’s anxiety is through the roof. Patient claims he has been sleeping. Sanjeev states that the patient’s anxiety is increasing. Case discussed with psychiatry.    writer called [facility] ([phone])  central bed board and spoke to Loyda who states there is no beds.  worker then called Mary Calderon [phone] and left a message inquiring about beds. As per provider, worker was informed to call pt’s wife for collateral information. Worker called patient’s Wife Mia ([phone]) for collateral information.     Wife reports that she drove the patient to the ED. She states that patient suffers from anxiety and depression. She states that the patient has been getting worse and was admitted to  two months ago and admitted for a week. Patient worsened when he was discharged. Wife kept repeating that she needs to go back to work and there is a lot going on. Wife states that she wants to take him to [facility] for treatment. She states that the patient sees Dr. Rosales at [facility]. She states that the patient also sees  Sanjeev Willams at the geriatric clinic.       Worker then called  Sanjeev Willams ([phone]) for collateral information. Sanjeev Willams states that the patient is new to their clinic and started in their clinic around 1/31/2023. She states that the patient’s wife drove him to the ED. Patient was referred through adult partial program. Patient was admitted to [facility] in October 2022 for a serious SA where he cut his wrists and wife found him in the bathtub. She states that the year prior patient had another SA (she cannot recall what was the attempt).  She states that the patient is now with the [facility] geriatric clinic. Patient is linked to Dr. Rosales. She states that the patient started a new job at the [employer] in [facility] as a . This job is overwhelming for patient because it is computerized, and this is causing patient additional anxiety. She states that the patient’s wife is a school nurse and took off work for a week because she has been afraid to leave him. Patient has been pacing back and forth and tapping his head. Patient has been anxious. Patient is in a step program through [facility] and patient has not been very vocal in program. Patient’s medication has not been changed since his discharge from the partial program.  Patient states that the patient went to his stepdaughter’s wedding in [place] and at that time he was very anxious to get on plane. When patient came back from this trip he went back to work and became very anxious. Patient’s wife wanted to send the patient to [place] with his family members; because of his anxiety and because she did not want to leave the patient alone. Sanjeev states that she tried to re-enroll the patient back in to [facility] at [facility] and at that time they did not have room. Patient then agreed to come to the ed. She states that she received a call back from partial and they were willing to take the patient on Monday, but this is not in person. Patient’s wife wants him to be at [facility] or newer building at [facility]. Patient did report that he was afraid he was suicidal. Sanjeev states that the patient is prescribed Remeron 30 mg at bedtime, Lexapro 20 once daily, Klonopin 0.5 one half tab twice a day in the am and afternoon, Zyprexa 15 mg daily, Amlodipine 10 mg daily, Lisinopril 20 mg two tabs daily and Atorvastatin 20 mg at bedtime. Patient is also on Lotrel (unsure of dosage) and melatonin. Patient’s anxiety is through the roof. Patient claims he has been sleeping. Sanjeev states that the patient’s anxiety is increasing. Case discussed with psychiatry.    writer called [facility] ([phone])  central bed board and spoke to Loyda who states there is no beds.  worker then called Mary Calderon [phone] and left a message inquiring about beds.   writer discussed case with psychiatry. Patient is cleared for discharge. Wife will be pick patient up. Patient and patient's wife encouraged to follow up with partial program on Monday. discharge plan discussed with haris Pace with provider. wife agrees with plan.

## 2023-03-09 NOTE — ED BEHAVIORAL HEALTH ASSESSMENT NOTE - OTHER PAST PSYCHIATRIC HISTORY (INCLUDE DETAILS REGARDING ONSET, COURSE OF ILLNESS, INPATIENT/OUTPATIENT TREATMENT)
hx of depression and anxiety  3 prior psych admissions  1 prior hx of left wrist slitting, 7/2021  no reported hx of SIB  current providers: Dr SALMA Rosales; RUTHY Carty

## 2023-03-09 NOTE — ED PROVIDER NOTE - PATIENT PORTAL LINK FT
You can access the FollowMyHealth Patient Portal offered by [facility] by registering at the following website: [email or web address]. By joining [facility]’s FollowMyHealth portal, you will also be able to view your health information using other applications (apps) compatible with our system.

## 2023-03-09 NOTE — ED PROVIDER NOTE - NSFOLLOWUPINSTRUCTIONS_ED_ALL_ED_FT
Patient advised of [facility] (M-F 9am-7pm) at [address] [place] [address] [phone] which offers crisis psychotherapy, and short-term medication management should patient have a delay in seeing outpatient psychiatrist or need to be evaluated by a mental health provider. If patient's family feels that the patient is at high risk of harm to self or others they should take the patient to the ED or call 911. Please provide patient this information upon discharge.     Advance activity as tolerated.  Continue all previously prescribed medications as directed unless otherwise instructed.  Follow up with your primary care physician in 48-72 hours- bring copies of your results.  Return to the ER for worsening or persistent symptoms, and/or ANY NEW OR CONCERNING SYMPTOMS. If you have issues obtaining follow up, please call: [phone] to obtain a doctor or specialist who takes your insurance in your area.  You may call [phone] to make an appointment with the internal medicine clinic.

## 2023-03-09 NOTE — ED ADULT NURSE NOTE - CHIEF COMPLAINT QUOTE
Patient has c/o severe anxiety and depression. He is in the partial step down program and to be reevaluated for admission to new unit at [facility]. Pt is on medications that don't seem to be working. Patient has suicidal ideations without a plan.

## 2023-03-09 NOTE — ED PROVIDER NOTE - CLINICAL SUMMARY MEDICAL DECISION MAKING FREE TEXT BOX
71 yo M here for worsening depression and would like to get admitted again.   pt was admitted to [facility] psych to Cathy Maddox 2 months ago, and discharged to partial outpatient program with [facility].   pt's wife contacted [facility] and they told her they had beds there for pt.  consulted Psych for possible transfer to [facility].  see consult note.     psych recommended ativan 0.25mg po for current anxiety.   pt given information to crisis center and number to bed board to check for available beds.

## 2023-03-09 NOTE — ED PROVIDER NOTE - OBJECTIVE STATEMENT
71 yo M with HTN, depression and anxiety, here for severe anxiety and depression. He is currently in the partial outpatient program, and feel the meds are not working. He has suicidal ideations without a plan.   according to wife, pt has attempted cutting himself before and verbalized that he wanted to hurt himself when being alone. pt's LUCIANA Benita called and stated that she is working on getting a bed in a unit other than [facility] where he had bad experience at.   at this time, pt denies SI/HI, denies chest pain, sob, fever.

## 2023-03-09 NOTE — ED BEHAVIORAL HEALTH ASSESSMENT NOTE - HPI (INCLUDE ILLNESS QUALITY, SEVERITY, DURATION, TIMING, CONTEXT, MODIFYING FACTORS, ASSOCIATED SIGNS AND SYMPTOMS)
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 ambulant without assist; bADL and iADL independent.. with past hx of depression and anxiety; 3 prior psych admissions ([facility], 7/2021; [facility], 10/2022 and [facility], 11/2022). 1 prior suicide attempt via slitting Left wrist using a albright's razor, 7/2021 - required suturing. no reported hx of illicit substance use including alcohol. pertinent medical issues; HTN and right cataract. [facility] and SI[facility]     he is seen initially pacing the hallway. then directed towards an examination room. claims that prior to 2021, had overall been doing well. however, in summer 2021, began to experience progressively worsening depression and anxiety. much so that in 7/2021, he had been feeling severely depressed and anxious - hopeless/ helpless as well, he resorted to slitting his left wrist using a albright's razor.  initially claimed intent to die but aborted the full act of slitting his wrist and potentially exsanguinating. he called wife. then activated 911. ended up at the ED where he underwent suturing of the laceration.  eventually admitted to IPU at [facility].     described depressive symptoms as experiencing sad mood + anhedonia. associated symptoms include: "mediocre concentration" and low energy level. no reported issues in terms of sleep - as current remeron prescribed, helps facilitate sleep. denied any changes to his appetite. lately, been more sad. 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  feels hopeful that he will get better but currently, helpless as despite good compliance to meds, he claims still feeling more sad and anxious.  also admits that intermittently, would harbor passive SI. currently denied harboring any passive or active SI. no HI.      apart from the depression, reported experiencing worsening anxiety symptoms. anxiety best described as an "overall sense of feeling nervous/ always on the edge". admits to being an excessive worrier. denied experiencing any panic attacks. also claimed having periodic flashbacks of his past suicide attempt (slitting left wrist in 7/2021); no reported nightmares.  Pt denied experiencing any specific anxiety disorder symptoms. no signs/ symptoms suggestive of charles (denied grandiosity/ racing thoughts/ increased goal directed activities or engaged in risk taking behavior/ no pressured speech/ no elevated mood/ denied any increased in energy level causing sleep disruption).  is not feeling paranoid. denied any perceptual disturbances.  no thought insertion/ withdrawal/ broadcasting.      today, told his wife that he had been feeling more anxious and sad. admitted to her that he was having intermittent passive SI. no intent or plans raised.  adamantly denied having any active SI.  wife encouraged him to come to the hospital - considered in-Pt psych admission. they reached out to [facility].  SW then adviced them to come to the ED. 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 ambulant without assist; bADL and iADL independent.. with past hx of depression and anxiety; 3 prior psych admissions ([facility], 7/2021; [facility], 10/2022 and [facility], 11/2022). 1 prior suicide attempt via slitting Left wrist using a albright's razor, 7/2021 - required suturing. no reported hx of illicit substance use including alcohol. pertinent medical issues; HTN, HLD and right cataract. [facility]  [facility] of [facility]/ anxiety and SI.     he is seen initially pacing the hallway. then directed towards an examination room. claims that prior to 2021, had overall been doing well. however, in summer 2021, began to experience progressively worsening depression and anxiety. much so that in 7/2021, he had been feeling severely depressed and anxious - hopeless/ helpless as well, he resorted to slitting his left wrist using a albrigth's razor.  initially claimed intent to die but aborted the full act of slitting his wrist and potentially exsanguinating. he called wife. then activated 911. ended up at the ED where he underwent suturing of the laceration.  eventually admitted to IPU at [facility].     described depressive symptoms as experiencing sad mood + anhedonia. associated symptoms include: "mediocre concentration" and low energy level. no reported issues in terms of sleep - as current remeron prescribed, helps facilitate sleep. denied any changes to his appetite. lately, been more sad. 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  feels hopeful that he will get better but currently, helpless as despite good compliance to meds, he claims still feeling more sad and anxious.  also admits that intermittently, would harbor passive SI. currently denied harboring any passive or active SI. no HI.      apart from the depression, reported experiencing worsening anxiety symptoms. anxiety best described as an "overall sense of feeling nervous/ always on the edge". admits to being an excessive worrier. denied experiencing any panic attacks. also claimed having periodic flashbacks of his past suicide attempt (slitting left wrist in 7/2021); no reported nightmares.  Pt denied experiencing any specific anxiety disorder symptoms. no signs/ symptoms suggestive of charles (denied grandiosity/ racing thoughts/ increased goal directed activities or engaged in risk taking behavior/ no pressured speech/ no elevated mood/ denied any increased in energy level causing sleep disruption).  is not feeling paranoid. denied any perceptual disturbances.  no thought insertion/ withdrawal/ broadcasting.      today, told his wife that he had been feeling more anxious and sad. admitted to her that he was having intermittent passive SI. no intent or plans raised.  adamantly denied having any active SI.  wife encouraged him to come to the hospital - considered in-Pt psych admission. they reached out to [facility].  SW then adviced them to come to the ED.

## 2023-03-09 NOTE — ED ADULT NURSE NOTE - NSIMPLEMENTINTERV_GEN_ALL_ED
Implemented All Universal Safety Interventions:  [place] to call system. Call bell, personal items and telephone within reach. Instruct patient to call for assistance. Room bathroom lighting operational. Non-slip footwear when patient is off stretcher. Physically safe environment: no spills, clutter or unnecessary equipment. Stretcher in lowest position, wheels locked, appropriate side rails in place.

## 2023-03-09 NOTE — ED BEHAVIORAL HEALTH ASSESSMENT NOTE - RISK ASSESSMENT
RISK FACTORS:  Modifiable risk factors: depression, anxiety, ? personality pathology, SI  Unmodifiable risk factors: hx of mood disorder and anxiety, past hx of multiple psych hosps, hx of Suicide attempt, elderly male, hx of self inflicted trauma with intrusive symptoms  Protective factors: currently denies (and no objective evidence of) suicidality, no hx of SA or any self injurious behaviors recently,  no family hx of SA, Domiciled and employed, future-oriented/ help seeking and able to safety plan, good support from wife, maintains good relationship with wife and daughter, endorses responsibility to family, no access to lethal means like guns,  no complex medical issues or chronic pains, is not acutely manic or floridly psychotic, no hx of violence or any pending legal cases, not intoxicated or in withdrawal, [place]    Given above, the Pt is currently at low acute suicide risk but remains at chronically elevated risk of self-harm.  At this time,  there are no identifiable acute increase in risk(s) that would be mitigated by an involuntary psychiatric admission.  Pt was offered voluntary admission to in-patient unit but refused.  The Pt remains appropriate for current level of care. Modifiable risk factors will be addressed in the current out-Pt treatment.

## 2023-03-09 NOTE — ED BEHAVIORAL HEALTH ASSESSMENT NOTE - SUMMARY
72/M with reported hx of depression and anxiety; 3 prior psych admissions ([facility], 7/2021; [facility], 10/2022 and [facility], 11/2022). 1 prior suicide attempt via slitting Left wrist using a albright's razor, 7/2021 - required suturing. no reported hx of illicit substance use including alcohol. pertinent medical issues; HTN and right cataract. presented to the ED BIB wife upon behest of [facility] for evaluation of depression/ anxiety and SI.    currently, endorses feeling sad and anxious lately despite reported compliance to psychotropics.  depressive symptoms meeting MDD criteria vs adjustment disorder with mixed anxiety and depressed mood. whereas anxiety symptoms may fulfill PTSD (flashbacks of slitting his left wrist) and HANK (excessively worrying about everything x > 6 months now).  Pt had previously endorsed intermittent, passive SI but no intent or plans. he is currently not harboring any passive or active SI or HI.  Pt's depressive and anxiety symptoms have been precipitated and perpetuated by ongoing stressors, namely: work related stressor, and dealing with poorly controlled depression + anxiety symptoms. wife reported that 2 weeks back, Pt had been "doing well".  Pt is not manifesting any signs/ symptoms of acute charles nor any florid psychosis.      at this time, there is no justification to pursue involuntary psych admission as he does not meet criteria for such. he was however, offered voluntary psych admission for which he refused (after he was adviced that [facility] as well as [facility] do not have any IPU beds available).  Pt expressed interest towards following up with [facility]. he was able to partake towards safety planning. he is keen towards optimization of meds + therapy. he refuses to be treated with ECT (citing memory loss issues).  wife and LUCIANA Carty were informed re: Pt's final dispo. both agreed to Pt being discharged back to the community.    RECOMMENDATIONS:   1. Psychoeducation provided.  Encouraged follow up with OP psych services.  encouraged continued compliance to all prescribed meds for now.. No indication for emergent psych meds changes at this time. Discussed important role of psychotherapy.  2. Emergency protocol reviewed.  Safety plan completed with Pt using the [place] Safety Plan.  Pt and wife were adviced to call 911 or come to the nearest ED should symptoms worsen; have increasing bouts of agitation/aggressive behavior; having SI/HI; or call [phone]    3. follow up with [facility] this Monday  4. no new psych meds prescribed

## 2023-03-10 ENCOUNTER — OUTPATIENT (OUTPATIENT)
Dept: OUTPATIENT SERVICES | Facility: HOSPITAL | Age: 73
LOS: 1 days | Discharge: HOME | End: 2023-03-10
Payer: MEDICARE

## 2023-03-13 PROCEDURE — 90792 PSYCH DIAG EVAL W/MED SRVCS: CPT

## 2023-03-20 PROCEDURE — 99214 OFFICE O/P EST MOD 30 MIN: CPT

## 2023-03-27 PROCEDURE — 99213 OFFICE O/P EST LOW 20 MIN: CPT

## 2023-04-04 PROCEDURE — 99213 OFFICE O/P EST LOW 20 MIN: CPT

## 2023-04-06 LAB
A1C WITH ESTIMATED AVERAGE GLUCOSE RESULT: 5.2 % — SIGNIFICANT CHANGE UP (ref 4–5.6)
ALBUMIN SERPL ELPH-MCNC: 4.6 G/DL — SIGNIFICANT CHANGE UP (ref 3.3–5)
ALP SERPL-CCNC: 61 U/L — SIGNIFICANT CHANGE UP (ref 40–120)
ALT FLD-CCNC: 14 U/L — SIGNIFICANT CHANGE UP (ref 4–41)
ANION GAP SERPL CALC-SCNC: 10 MMOL/L — SIGNIFICANT CHANGE UP (ref 7–14)
APPEARANCE UR: CLEAR — SIGNIFICANT CHANGE UP
AST SERPL-CCNC: 21 U/L — SIGNIFICANT CHANGE UP (ref 4–40)
BASOPHILS # BLD AUTO: 0.07 K/UL — SIGNIFICANT CHANGE UP (ref 0–0.2)
BASOPHILS NFR BLD AUTO: 1.3 % — SIGNIFICANT CHANGE UP (ref 0–2)
BILIRUB SERPL-MCNC: 0.7 MG/DL — SIGNIFICANT CHANGE UP (ref 0.2–1.2)
BILIRUB UR-MCNC: NEGATIVE — SIGNIFICANT CHANGE UP
BUN SERPL-MCNC: 25 MG/DL — HIGH (ref 7–23)
CALCIUM SERPL-MCNC: 9.1 MG/DL — SIGNIFICANT CHANGE UP (ref 8.4–10.5)
CHLORIDE SERPL-SCNC: 103 MMOL/L — SIGNIFICANT CHANGE UP (ref 98–107)
CHOLEST SERPL-MCNC: 249 MG/DL — HIGH
CO2 SERPL-SCNC: 29 MMOL/L — SIGNIFICANT CHANGE UP (ref 22–31)
COLOR SPEC: SIGNIFICANT CHANGE UP
CREAT SERPL-MCNC: 1.14 MG/DL — SIGNIFICANT CHANGE UP (ref 0.5–1.3)
DIFF PNL FLD: NEGATIVE — SIGNIFICANT CHANGE UP
EGFR: 68 ML/MIN/1.73M2 — SIGNIFICANT CHANGE UP
EOSINOPHIL # BLD AUTO: 0.32 K/UL — SIGNIFICANT CHANGE UP (ref 0–0.5)
EOSINOPHIL NFR BLD AUTO: 6.2 % — HIGH (ref 0–6)
ESTIMATED AVERAGE GLUCOSE: 103 — SIGNIFICANT CHANGE UP
GLUCOSE SERPL-MCNC: 98 MG/DL — SIGNIFICANT CHANGE UP (ref 70–99)
GLUCOSE UR QL: NEGATIVE — SIGNIFICANT CHANGE UP
HCT VFR BLD CALC: 44.6 % — SIGNIFICANT CHANGE UP (ref 39–50)
HDLC SERPL-MCNC: 48 MG/DL — SIGNIFICANT CHANGE UP
HGB BLD-MCNC: 14.6 G/DL — SIGNIFICANT CHANGE UP (ref 13–17)
IANC: 3.19 K/UL — SIGNIFICANT CHANGE UP (ref 1.8–7.4)
IMM GRANULOCYTES NFR BLD AUTO: 0.4 % — SIGNIFICANT CHANGE UP (ref 0–0.9)
KETONES UR-MCNC: NEGATIVE — SIGNIFICANT CHANGE UP
LEUKOCYTE ESTERASE UR-ACNC: NEGATIVE — SIGNIFICANT CHANGE UP
LIPID PNL WITH DIRECT LDL SERPL: 180 MG/DL — HIGH
LYMPHOCYTES # BLD AUTO: 1.22 K/UL — SIGNIFICANT CHANGE UP (ref 1–3.3)
LYMPHOCYTES # BLD AUTO: 23.5 % — SIGNIFICANT CHANGE UP (ref 13–44)
MCHC RBC-ENTMCNC: 29.9 PG — SIGNIFICANT CHANGE UP (ref 27–34)
MCHC RBC-ENTMCNC: 32.7 GM/DL — SIGNIFICANT CHANGE UP (ref 32–36)
MCV RBC AUTO: 91.4 FL — SIGNIFICANT CHANGE UP (ref 80–100)
MONOCYTES # BLD AUTO: 0.37 K/UL — SIGNIFICANT CHANGE UP (ref 0–0.9)
MONOCYTES NFR BLD AUTO: 7.1 % — SIGNIFICANT CHANGE UP (ref 2–14)
NEUTROPHILS # BLD AUTO: 3.19 K/UL — SIGNIFICANT CHANGE UP (ref 1.8–7.4)
NEUTROPHILS NFR BLD AUTO: 61.5 % — SIGNIFICANT CHANGE UP (ref 43–77)
NITRITE UR-MCNC: NEGATIVE — SIGNIFICANT CHANGE UP
NON HDL CHOLESTEROL: 201 MG/DL — HIGH
NRBC # BLD: 0 /100 WBCS — SIGNIFICANT CHANGE UP (ref 0–0)
NRBC # FLD: 0 K/UL — SIGNIFICANT CHANGE UP (ref 0–0)
PH UR: 6.5 — SIGNIFICANT CHANGE UP (ref 5–8)
PLATELET # BLD AUTO: 188 K/UL — SIGNIFICANT CHANGE UP (ref 150–400)
POTASSIUM SERPL-MCNC: 4 MMOL/L — SIGNIFICANT CHANGE UP (ref 3.5–5.3)
POTASSIUM SERPL-SCNC: 4 MMOL/L — SIGNIFICANT CHANGE UP (ref 3.5–5.3)
PROT SERPL-MCNC: 7.1 G/DL — SIGNIFICANT CHANGE UP (ref 6–8.3)
PROT UR-MCNC: NEGATIVE — SIGNIFICANT CHANGE UP
RBC # BLD: 4.88 M/UL — SIGNIFICANT CHANGE UP (ref 4.2–5.8)
RBC # FLD: 12.8 % — SIGNIFICANT CHANGE UP (ref 10.3–14.5)
SODIUM SERPL-SCNC: 142 MMOL/L — SIGNIFICANT CHANGE UP (ref 135–145)
SP GR SPEC: 1.02 — SIGNIFICANT CHANGE UP (ref 1.01–1.05)
T3 SERPL-MCNC: 65 NG/DL — LOW (ref 80–200)
T4 AB SER-ACNC: 5.47 UG/DL — SIGNIFICANT CHANGE UP (ref 5.1–13)
TRIGL SERPL-MCNC: 104 MG/DL — SIGNIFICANT CHANGE UP
TSH SERPL-MCNC: 1.43 UIU/ML — SIGNIFICANT CHANGE UP (ref 0.27–4.2)
UROBILINOGEN FLD QL: SIGNIFICANT CHANGE UP
WBC # BLD: 5.19 K/UL — SIGNIFICANT CHANGE UP (ref 3.8–10.5)
WBC # FLD AUTO: 5.19 K/UL — SIGNIFICANT CHANGE UP (ref 3.8–10.5)

## 2023-04-06 PROCEDURE — 93010 ELECTROCARDIOGRAM REPORT: CPT

## 2023-04-12 DIAGNOSIS — F32.9 MAJOR DEPRESSIVE DISORDER, SINGLE EPISODE, UNSPECIFIED: ICD-10-CM

## 2023-04-12 DIAGNOSIS — F41.9 ANXIETY DISORDER, UNSPECIFIED: ICD-10-CM

## 2023-04-12 RX ORDER — SERTRALINE 25 MG/1
1 TABLET, FILM COATED ORAL
Qty: 30 | Refills: 0
Start: 2023-04-12 | End: 2023-05-11

## 2023-04-17 PROCEDURE — 99213 OFFICE O/P EST LOW 20 MIN: CPT

## 2023-04-27 ENCOUNTER — OUTPATIENT (OUTPATIENT)
Dept: OUTPATIENT SERVICES | Facility: HOSPITAL | Age: 73
LOS: 1 days | Discharge: HOME | End: 2023-04-27
Payer: MEDICARE

## 2023-04-27 PROCEDURE — 90792 PSYCH DIAG EVAL W/MED SRVCS: CPT | Mod: 95

## 2023-05-02 PROCEDURE — 99215 OFFICE O/P EST HI 40 MIN: CPT | Mod: 95

## 2023-05-04 PROCEDURE — 90832 PSYTX W PT 30 MINUTES: CPT | Mod: 95

## 2023-05-11 PROCEDURE — 90834 PSYTX W PT 45 MINUTES: CPT | Mod: 95

## 2023-05-18 DIAGNOSIS — H26.9 UNSPECIFIED CATARACT: ICD-10-CM

## 2023-05-18 DIAGNOSIS — F32.9 MAJOR DEPRESSIVE DISORDER, SINGLE EPISODE, UNSPECIFIED: ICD-10-CM

## 2023-05-18 DIAGNOSIS — E78.00 PURE HYPERCHOLESTEROLEMIA, UNSPECIFIED: ICD-10-CM

## 2023-05-18 DIAGNOSIS — F41.9 ANXIETY DISORDER, UNSPECIFIED: ICD-10-CM

## 2023-05-18 DIAGNOSIS — I10 ESSENTIAL (PRIMARY) HYPERTENSION: ICD-10-CM

## 2023-05-25 PROCEDURE — 90834 PSYTX W PT 45 MINUTES: CPT | Mod: 95

## 2023-05-30 NOTE — ECT PRE-PROCEDURE CHECKLIST - PRO INTERPRETER NEED 2
Prolia supplied by the physician office. Informed patient if any signs of redness,rash,swelling or unusual symptoms occur, please contact the office. Prolia given per physician order.
English
English

## 2023-06-02 PROCEDURE — 90834 PSYTX W PT 45 MINUTES: CPT | Mod: 95

## 2023-06-07 PROCEDURE — 99214 OFFICE O/P EST MOD 30 MIN: CPT | Mod: 95

## 2023-06-07 PROCEDURE — 90832 PSYTX W PT 30 MINUTES: CPT | Mod: 95

## 2023-06-15 PROCEDURE — 90834 PSYTX W PT 45 MINUTES: CPT | Mod: 95

## 2023-06-20 PROCEDURE — 99443: CPT | Mod: 95

## 2023-06-23 PROCEDURE — 90832 PSYTX W PT 30 MINUTES: CPT | Mod: 95

## 2023-06-28 PROCEDURE — 90832 PSYTX W PT 30 MINUTES: CPT | Mod: 95

## 2023-07-06 PROCEDURE — 90834 PSYTX W PT 45 MINUTES: CPT | Mod: 95

## 2023-07-13 PROCEDURE — 90834 PSYTX W PT 45 MINUTES: CPT | Mod: 95

## 2023-07-19 PROCEDURE — 99443: CPT | Mod: 95

## 2023-07-20 PROCEDURE — 90832 PSYTX W PT 30 MINUTES: CPT | Mod: 95

## 2023-07-27 PROCEDURE — 90832 PSYTX W PT 30 MINUTES: CPT | Mod: 95

## 2023-10-09 NOTE — BH CONSULTATION LIAISON PROGRESS NOTE - NSBHASSESSMENTFT_PSY_ALL_CORE
Spiritual Care Visit    Clinical Encounter Type  Visited With: Patient  Routine Visit: Follow-up  Continue Visiting: Yes         Values/Beliefs  Spiritual Requests During Hospitalization: [place] today    Orlando Chakraborty                                         
Pt is a 72 y/o  white male with hx of depression/anxiety, presents with recent suicide attempt via wrist laceration, severed his left radial artery, requiring sutures by trauma surgery. pt admits to suicide attempt due to worsening depression/anxiety in recent weeks, feels overwhelmed. pt feels hopeless and helpless for many weeks, and on saturday cut his left wrist with a knife at a park in [place], severing his radial artery. He called his wife after this event, who then called 911 and sent to [facility] for treatment. pt just started on klonopin and remeron for anxiety/depression. remains on 1:1 and will require inpt psychiatric admission for further management. 
Pt is a 72 y/o  white male with hx of depression/anxiety, presents with recent suicide attempt via wrist laceration, severed his left radial artery, requiring sutures by trauma surgery. pt admits to suicide attempt due to worsening depression/anxiety in recent weeks, feels overwhelmed. pt feels hopeless and helpless for many weeks, and on saturday cut his left wrist with a knife at a park in [place], severing his radial artery. He called his wife after this event, who then called 911 and sent to [facility] for treatment. pt just started on klonopin and remeron for anxiety/depression. remains on 1:1 and will require inpt psychiatric admission for further management.

## 2024-06-28 NOTE — BRIEF OPERATIVE NOTE - NSICDXBRIEFPOSTOP_GEN_ALL_CORE_FT
Reached out to the transfer center to see if Von received pink slip fax   POST-OP DIAGNOSIS:  Injury of left radial artery 10-Jul-2021 23:00:07  Willam Marcos

## 2024-12-10 NOTE — PROGRESS NOTE ADULT - REASON FOR ADMISSION
L radial a injury
L radial artery injury
hard copy, drawn during this pregnancy